# Patient Record
Sex: FEMALE | Race: WHITE | Employment: UNEMPLOYED | URBAN - METROPOLITAN AREA
[De-identification: names, ages, dates, MRNs, and addresses within clinical notes are randomized per-mention and may not be internally consistent; named-entity substitution may affect disease eponyms.]

---

## 2017-10-09 ENCOUNTER — APPOINTMENT (OUTPATIENT)
Dept: GENERAL RADIOLOGY | Age: 42
End: 2017-10-09
Attending: EMERGENCY MEDICINE
Payer: SELF-PAY

## 2017-10-09 ENCOUNTER — HOSPITAL ENCOUNTER (EMERGENCY)
Age: 42
Discharge: HOME | End: 2017-10-10
Attending: EMERGENCY MEDICINE
Payer: SELF-PAY

## 2017-10-09 ENCOUNTER — APPOINTMENT (OUTPATIENT)
Dept: CT IMAGING | Age: 42
End: 2017-10-09
Attending: EMERGENCY MEDICINE
Payer: SELF-PAY

## 2017-10-09 VITALS
DIASTOLIC BLOOD PRESSURE: 79 MMHG | OXYGEN SATURATION: 97 % | SYSTOLIC BLOOD PRESSURE: 175 MMHG | TEMPERATURE: 98.9 F | WEIGHT: 293 LBS | HEART RATE: 99 BPM | RESPIRATION RATE: 18 BRPM | HEIGHT: 62 IN | BODY MASS INDEX: 53.92 KG/M2

## 2017-10-09 DIAGNOSIS — R42 DIZZINESS: Primary | ICD-10-CM

## 2017-10-09 DIAGNOSIS — R51.9 NONINTRACTABLE EPISODIC HEADACHE, UNSPECIFIED HEADACHE TYPE: ICD-10-CM

## 2017-10-09 LAB
APPEARANCE UR: CLEAR
BACTERIA URNS QL MICRO: NEGATIVE /HPF
BASOPHILS # BLD: 0 K/UL (ref 0–0.1)
BASOPHILS NFR BLD: 0 % (ref 0–1)
BILIRUB UR QL: NEGATIVE
COLOR UR: NORMAL
EOSINOPHIL # BLD: 0.2 K/UL (ref 0–0.4)
EOSINOPHIL NFR BLD: 2 % (ref 0–7)
EPITH CASTS URNS QL MICRO: NORMAL /LPF
ERYTHROCYTE [DISTWIDTH] IN BLOOD BY AUTOMATED COUNT: 12.8 % (ref 11.5–14.5)
GLUCOSE UR STRIP.AUTO-MCNC: NEGATIVE MG/DL
HCT VFR BLD AUTO: 38 % (ref 35–47)
HGB BLD-MCNC: 12.6 G/DL (ref 11.5–16)
HGB UR QL STRIP: NEGATIVE
HYALINE CASTS URNS QL MICRO: NORMAL /LPF (ref 0–5)
KETONES UR QL STRIP.AUTO: NEGATIVE MG/DL
LACTATE SERPL-SCNC: 0.9 MMOL/L (ref 0.4–2)
LEUKOCYTE ESTERASE UR QL STRIP.AUTO: NEGATIVE
LYMPHOCYTES # BLD: 2.3 K/UL (ref 0.8–3.5)
LYMPHOCYTES NFR BLD: 23 % (ref 12–49)
MCH RBC QN AUTO: 30.5 PG (ref 26–34)
MCHC RBC AUTO-ENTMCNC: 33.2 G/DL (ref 30–36.5)
MCV RBC AUTO: 92 FL (ref 80–99)
MONOCYTES # BLD: 0.5 K/UL (ref 0–1)
MONOCYTES NFR BLD: 5 % (ref 5–13)
NEUTS SEG # BLD: 7 K/UL (ref 1.8–8)
NEUTS SEG NFR BLD: 70 % (ref 32–75)
NITRITE UR QL STRIP.AUTO: NEGATIVE
PH UR STRIP: 5.5 [PH] (ref 5–8)
PLATELET # BLD AUTO: 241 K/UL (ref 150–400)
PROT UR STRIP-MCNC: NEGATIVE MG/DL
RBC # BLD AUTO: 4.13 M/UL (ref 3.8–5.2)
RBC #/AREA URNS HPF: NORMAL /HPF (ref 0–5)
SP GR UR REFRACTOMETRY: 1.01 (ref 1–1.03)
UA: UC IF INDICATED,UAUC: NORMAL
UROBILINOGEN UR QL STRIP.AUTO: 0.2 EU/DL (ref 0.2–1)
WBC # BLD AUTO: 10.1 K/UL (ref 3.6–11)
WBC URNS QL MICRO: NORMAL /HPF (ref 0–4)

## 2017-10-09 PROCEDURE — 36415 COLL VENOUS BLD VENIPUNCTURE: CPT | Performed by: EMERGENCY MEDICINE

## 2017-10-09 PROCEDURE — 83605 ASSAY OF LACTIC ACID: CPT | Performed by: EMERGENCY MEDICINE

## 2017-10-09 PROCEDURE — 99283 EMERGENCY DEPT VISIT LOW MDM: CPT

## 2017-10-09 PROCEDURE — 71020 XR CHEST PA LAT: CPT

## 2017-10-09 PROCEDURE — 85025 COMPLETE CBC W/AUTO DIFF WBC: CPT | Performed by: EMERGENCY MEDICINE

## 2017-10-09 PROCEDURE — 93005 ELECTROCARDIOGRAM TRACING: CPT

## 2017-10-09 PROCEDURE — 70450 CT HEAD/BRAIN W/O DYE: CPT

## 2017-10-09 PROCEDURE — 81001 URINALYSIS AUTO W/SCOPE: CPT | Performed by: EMERGENCY MEDICINE

## 2017-10-09 NOTE — LETTER
[address] 
[facility] EMERGENCY DEPT 
[address] [place] [address] [phone] 
[phone] Work/School Note Date: 10/9/2017 To Whom It May concern: 
 
Javed Purvis was seen and treated today in the emergency room by the following provider(s): 
Attending Provider: Sabrina Richmond MD. Javed Purvis may return to work on 10/11/17.  
 
Sincerely, 
 
 
 
 
Sabrina Richmond MD

## 2017-10-10 LAB
ALBUMIN SERPL-MCNC: 3.1 G/DL (ref 3.5–5)
ALBUMIN/GLOB SERPL: 0.9 {RATIO} (ref 1.1–2.2)
ALP SERPL-CCNC: 65 U/L (ref 45–117)
ALT SERPL-CCNC: 20 U/L (ref 12–78)
ANION GAP SERPL CALC-SCNC: 7 MMOL/L (ref 5–15)
APTT PPP: 24.9 SEC (ref 22.1–32.5)
AST SERPL-CCNC: 14 U/L (ref 15–37)
BILIRUB SERPL-MCNC: 0.2 MG/DL (ref 0.2–1)
BUN SERPL-MCNC: 12 MG/DL (ref 6–20)
BUN/CREAT SERPL: 15 (ref 12–20)
CALCIUM SERPL-MCNC: 8.2 MG/DL (ref 8.5–10.1)
CHLORIDE SERPL-SCNC: 106 MMOL/L (ref 97–108)
CK MB CFR SERPL CALC: NORMAL % (ref 0–2.5)
CK MB SERPL-MCNC: <1 NG/ML (ref 5–25)
CK SERPL-CCNC: 103 U/L (ref 26–192)
CO2 SERPL-SCNC: 25 MMOL/L (ref 21–32)
CREAT SERPL-MCNC: 0.79 MG/DL (ref 0.55–1.02)
GLOBULIN SER CALC-MCNC: 3.4 G/DL (ref 2–4)
GLUCOSE SERPL-MCNC: 97 MG/DL (ref 65–100)
INR PPP: 1 (ref 0.9–1.1)
LIPASE SERPL-CCNC: 180 U/L (ref 73–393)
POTASSIUM SERPL-SCNC: 3.7 MMOL/L (ref 3.5–5.1)
PROT SERPL-MCNC: 6.5 G/DL (ref 6.4–8.2)
PROTHROMBIN TIME: 10 SEC (ref 9–11.1)
SODIUM SERPL-SCNC: 138 MMOL/L (ref 136–145)
THERAPEUTIC RANGE,PTTT: NORMAL SECS (ref 58–77)
TROPONIN I SERPL-MCNC: <0.04 NG/ML

## 2017-10-10 PROCEDURE — 36415 COLL VENOUS BLD VENIPUNCTURE: CPT | Performed by: EMERGENCY MEDICINE

## 2017-10-10 PROCEDURE — 80053 COMPREHEN METABOLIC PANEL: CPT | Performed by: EMERGENCY MEDICINE

## 2017-10-10 PROCEDURE — 84484 ASSAY OF TROPONIN QUANT: CPT | Performed by: EMERGENCY MEDICINE

## 2017-10-10 PROCEDURE — 85730 THROMBOPLASTIN TIME PARTIAL: CPT | Performed by: EMERGENCY MEDICINE

## 2017-10-10 PROCEDURE — 83690 ASSAY OF LIPASE: CPT | Performed by: EMERGENCY MEDICINE

## 2017-10-10 PROCEDURE — 85610 PROTHROMBIN TIME: CPT | Performed by: EMERGENCY MEDICINE

## 2017-10-10 PROCEDURE — 82550 ASSAY OF CK (CPK): CPT | Performed by: EMERGENCY MEDICINE

## 2017-10-10 RX ORDER — DEXAMETHASONE SODIUM PHOSPHATE 4 MG/ML
10 INJECTION, SOLUTION INTRA-ARTICULAR; INTRALESIONAL; INTRAMUSCULAR; INTRAVENOUS; SOFT TISSUE
Status: DISCONTINUED | OUTPATIENT
Start: 2017-10-10 | End: 2017-10-10 | Stop reason: HOSPADM

## 2017-10-10 RX ORDER — KETOROLAC TROMETHAMINE 30 MG/ML
30 INJECTION, SOLUTION INTRAMUSCULAR; INTRAVENOUS
Status: DISCONTINUED | OUTPATIENT
Start: 2017-10-10 | End: 2017-10-10 | Stop reason: HOSPADM

## 2017-10-10 NOTE — ED NOTES
Pt arrives ambulatory to room c/o headache and dizziness x 2 months. Pt states she has no insurance and has ignored the symptoms until today. Pt states she feels lightheaded when going to stand up. Pt states pain is in back of head and is at 5/10 at this time. Pt states she had some nausea earlier today but has none now. Pt states her head feels like its a fog. Pt denies aphasia or  Monitor x 3. Call bell within reach and plan of care discussed.

## 2017-10-10 NOTE — DISCHARGE INSTRUCTIONS
Dizziness: Care Instructions  Your Care Instructions  Dizziness is the feeling of unsteadiness or fuzziness in your head. It is different than having vertigo, which is a feeling that the room is spinning or that you are moving or falling. It is also different from lightheadedness, which is the feeling that you are about to faint. It can be hard to know what causes dizziness. Some people feel dizzy when they have migraine headaches. Sometimes bouts of flu can make you feel dizzy. Some medical conditions, such as heart problems or high blood pressure, can make you feel dizzy. Many medicines can cause dizziness, including medicines for high blood pressure, pain, or anxiety. If a medicine causes your symptoms, your doctor may recommend that you stop or change the medicine. If it is a problem with your heart, you may need medicine to help your heart work better. If there is no clear reason for your symptoms, your doctor may suggest watching and waiting for a while to see if the dizziness goes away on its own. Follow-up care is a key part of your treatment and safety. Be sure to make and go to all appointments, and call your doctor if you are having problems. It's also a good idea to know your test results and keep a list of the medicines you take. How can you care for yourself at home? · If your doctor recommends or prescribes medicine, take it exactly as directed. Call your doctor if you think you are having a problem with your medicine. · Do not drive while you feel dizzy. · Try to prevent falls. Steps you can take include:  ¨ Using nonskid mats, adding grab bars near the tub, and using night-lights. ¨ Clearing your home so that walkways are free of anything you might trip on. ¨ Letting family and friends know that you have been feeling dizzy. This will help them know how to help you. When should you call for help? Call 911 anytime you think you may need emergency care.  For example, call if:  · You passed out (lost consciousness). · You have dizziness along with symptoms of a heart attack. These may include:  ¨ Chest pain or pressure, or a strange feeling in the chest.  ¨ Sweating. ¨ Shortness of breath. ¨ Nausea or vomiting. ¨ Pain, pressure, or a strange feeling in the back, neck, jaw, or upper belly or in one or both shoulders or arms. ¨ Lightheadedness or sudden weakness. ¨ A fast or irregular heartbeat. · You have symptoms of a stroke. These may include:  ¨ Sudden numbness, tingling, weakness, or loss of movement in your face, arm, or leg, especially on only one side of your body. ¨ Sudden vision changes. ¨ Sudden trouble speaking. ¨ Sudden confusion or trouble understanding simple statements. ¨ Sudden problems with walking or balance. ¨ A sudden, severe headache that is different from past headaches. Call your doctor now or seek immediate medical care if:  · You feel dizzy and have a fever, headache, or ringing in your ears. · You have new or increased nausea and vomiting. · Your dizziness does not go away or comes back. Watch closely for changes in your health, and be sure to contact your doctor if:  · You do not get better as expected. Where can you learn more? Go to http://elissa-devorah.info/. Enter E190 in the search box to learn more about \"Dizziness: Care Instructions. \"  Current as of: March 20, 2017  Content Version: 11.3  © 1920-9040 [employer]. Care instructions adapted under license by [employer] (which disclaims liability or warranty for this information). If you have questions about a medical condition or this instruction, always ask your healthcare professional. [place] [address] any warranty or liability for your use of this information. Head or Face Pain: Care Instructions  Your Care Instructions  Common causes of head or face pain are allergies, stress, and injuries.  Other causes include tooth problems and sinus infections. Eating certain foods, such as chocolate or cheese, or drinking certain liquids, such as coffee or cola, can cause head pain for some people. If you have mild head pain, you may not need treatment. It is important to watch your symptoms and talk to your doctor if your pain continues or gets worse. Follow-up care is a key part of your treatment and safety. Be sure to make and go to all appointments, and call your doctor if you are having problems. It's also a good idea to know your test results and keep a list of the medicines you take. How can you care for yourself at home? · Take pain medicines exactly as directed. ¨ If the doctor gave you a prescription medicine for pain, take it as prescribed. ¨ If you are not taking a prescription pain medicine, ask your doctor if you can take an over-the-counter pain medicine. · Take it easy for the next few days or longer if you are not feeling well. · Use a warm, moist towel or heating pad set on low to relax tight muscles in your shoulder and neck. Have someone gently massage your neck and shoulders. · Put ice or a cold pack on the area for 10 to 20 minutes at a time. Put a thin cloth between the ice and your skin. When should you call for help? Call 911 anytime you think you may need emergency care. For example, call if:  · You have twitching, jerking, or a seizure. · You passed out (lost consciousness). · You have symptoms of a stroke. These may include:  ¨ Sudden numbness, tingling, weakness, or loss of movement in your face, arm, or leg, especially on only one side of your body. ¨ Sudden vision changes. ¨ Sudden trouble speaking. ¨ Sudden confusion or trouble understanding simple statements. ¨ Sudden problems with walking or balance. ¨ A sudden, severe headache that is different from past headaches. · You have jaw pain and pain in your chest, shoulder, neck, or arm.   Call your doctor now or seek immediate medical care if:  · You have a fever with a stiff neck or a severe headache. · You have nausea and vomiting, or you cannot keep food or liquids down. Watch closely for changes in your health, and be sure to contact your doctor if:  · Your head or face pain does not get better as expected. Where can you learn more? Go to http://elissa-devorah.info/. Enter P568 in the search box to learn more about \"Head or Face Pain: Care Instructions. \"  Current as of: March 20, 2017  Content Version: 11.3  © 1252-2500 [employer]. Care instructions adapted under license by [employer] (which disclaims liability or warranty for this information). If you have questions about a medical condition or this instruction, always ask your healthcare professional. [address] any warranty or liability for your use of this information. Headache: Care Instructions  Your Care Instructions    Headaches have many possible causes. Most headaches aren't a sign of a more serious problem, and they will get better on their own. Home treatment may help you feel better faster. The doctor has checked you carefully, but problems can develop later. If you notice any problems or new symptoms, get medical treatment right away. Follow-up care is a key part of your treatment and safety. Be sure to make and go to all appointments, and call your doctor if you are having problems. It's also a good idea to know your test results and keep a list of the medicines you take. How can you care for yourself at home? · Do not drive if you have taken a prescription pain medicine. · Rest in a quiet, dark room until your headache is gone. Close your eyes and try to relax or go to sleep. Don't watch TV or read. · Put a cold, moist cloth or cold pack on the painful area for 10 to 20 minutes at a time. Put a thin cloth between the cold pack and your skin.   · Use a warm, moist towel or a heating pad set on low to relax tight shoulder and neck muscles. · Have someone gently massage your neck and shoulders. · Take pain medicines exactly as directed. ¨ If the doctor gave you a prescription medicine for pain, take it as prescribed. ¨ If you are not taking a prescription pain medicine, ask your doctor if you can take an over-the-counter medicine. · Be careful not to take pain medicine more often than the instructions allow, because you may get worse or more frequent headaches when the medicine wears off. · Do not ignore new symptoms that occur with a headache, such as a fever, weakness or numbness, vision changes, or confusion. These may be signs of a more serious problem. To prevent headaches  · Keep a headache diary so you can figure out what triggers your headaches. Avoiding triggers may help you prevent headaches. Record when each headache began, how long it lasted, and what the pain was like (throbbing, aching, stabbing, or dull). Write down any other symptoms you had with the headache, such as nausea, flashing lights or dark spots, or sensitivity to bright light or loud noise. Note if the headache occurred near your period. List anything that might have triggered the headache, such as certain foods (chocolate, cheese, wine) or odors, smoke, bright light, stress, or lack of sleep. · Find healthy ways to deal with stress. Headaches are most common during or right after stressful times. Take time to relax before and after you do something that has caused a headache in the past.  · Try to keep your muscles relaxed by keeping good posture. Check your jaw, face, neck, and shoulder muscles for tension, and try relaxing them. When sitting at a desk, change positions often, and stretch for 30 seconds each hour. · Get plenty of sleep and exercise. · Eat regularly and well. Long periods without food can trigger a headache. · Treat yourself to a massage.  Some people find that regular massages are very helpful in relieving tension. · Limit caffeine by not drinking too much coffee, tea, or soda. But don't quit caffeine suddenly, because that can also give you headaches. · Reduce eyestrain from computers by blinking frequently and looking away from the computer screen every so often. Make sure you have proper eyewear and that your monitor is set up properly, about an arm's length away. · Seek help if you have depression or anxiety. Your headaches may be linked to these conditions. Treatment can both prevent headaches and help with symptoms of anxiety or depression. When should you call for help? Call 911 anytime you think you may need emergency care. For example, call if:  · You have signs of a stroke. These may include:  ¨ Sudden numbness, paralysis, or weakness in your face, arm, or leg, especially on only one side of your body. ¨ Sudden vision changes. ¨ Sudden trouble speaking. ¨ Sudden confusion or trouble understanding simple statements. ¨ Sudden problems with walking or balance. ¨ A sudden, severe headache that is different from past headaches. Call your doctor now or seek immediate medical care if:  · You have a new or worse headache. · Your headache gets much worse. Where can you learn more? Go to http://elissa-devorah.info/. Enter M271 in the search box to learn more about \"Headache: Care Instructions. \"  Current as of: October 14, 2016  Content Version: 11.3  © 9898-1066 [employer]. Care instructions adapted under license by [employer] (which disclaims liability or warranty for this information). If you have questions about a medical condition or this instruction, always ask your healthcare professional. [place] [address] any warranty or liability for your use of this information. Vertigo: Care Instructions  Your Care Instructions  Vertigo is the feeling that you or your surroundings are moving when there is no actual movement.  It is often described as a feeling of spinning, whirling, falling, or tilting. Vertigo may make you vomit or feel nauseated. You may have trouble standing or walking and may lose your balance. Vertigo is often related to an inner ear problem, but it can have other more serious causes. If vertigo continues, you may need more tests to find its cause. Follow-up care is a key part of your treatment and safety. Be sure to make and go to all appointments, and call your doctor if you are having problems. Its also a good idea to know your test results and keep a list of the medicines you take. How can you care for yourself at home? · Do not lie flat on your back. Prop yourself up slightly. This may reduce the spinning feeling. Keep your eyes open. · Move slowly so that you do not fall. · If your doctor recommends medicine, take it exactly as directed. · Do not drive while you are having vertigo. Certain exercises, called Zhang-Daroff exercises, can help decrease vertigo. To do Zhang-Daroff exercises:  · Sit on the edge of a bed or sofa and quickly lie down on the side that causes the worst vertigo. Lie on your side with your ear down. · Stay in this position for at least 30 seconds or until the vertigo goes away. · Sit up. If this causes vertigo, wait for it to stop. · Repeat the procedure on the other side. · Repeat this 10 times. Do these exercises 2 times a day until the vertigo is gone. When should you call for help? Call 911 anytime you think you may need emergency care. For example, call if:  · You passed out (lost consciousness). · You have symptoms of a stroke. These may include:  ¨ Sudden numbness, tingling, weakness, or loss of movement in your face, arm, or leg, especially on only one side of your body. ¨ Sudden vision changes. ¨ Sudden trouble speaking. ¨ Sudden confusion or trouble understanding simple statements. ¨ Sudden problems with walking or balance.   ¨ A sudden, severe headache that is different from past headaches. Call your doctor now or seek immediate medical care if:  · Vertigo occurs with a fever, a headache, or ringing in your ears. · You have new or increased nausea and vomiting. Watch closely for changes in your health, and be sure to contact your doctor if:  · Vertigo gets worse or happens more often. · Vertigo has not gotten better after 2 weeks. Where can you learn more? Go to http://elissa-devorah.info/. Enter J628 in the search box to learn more about \"Vertigo: Care Instructions. \"  Current as of: July 29, 2016  Content Version: 11.3  © 8554-4889 [employer]. Care instructions adapted under license by [employer] (which disclaims liability or warranty for this information). If you have questions about a medical condition or this instruction, always ask your healthcare professional. [address] any warranty or liability for your use of this information.

## 2017-10-10 NOTE — ED NOTES
Pt received discharge instructions from Dr. Byron Sainz and verbalized understanding. Pt ambulatory to exit.

## 2017-10-10 NOTE — ED PROVIDER NOTES
[facility].  EMERGENCY DEPARTMENT HISTORY AND PHYSICAL EXAM       Date of Service: 10/9/2017   Patient Name: Alexa Harris   YOB: 1975  Medical Record Number: [id number]    History of Presenting Illness     Chief Complaint   Patient presents with    Dizziness     x a few months. Reports sx have become more frequent in recent weeks.  Headache     x a few months. Describes as head \"pressure\", (posterior).  Blurred Vision     x a few months. History Provided By:  patient    Additional History:     Alexa Harris is a 38 y.o. female, who presents ambulatory to the ED c/o intermittent episodes of diffuse L sided pressure-like HA with L sided blurred vision x 3 months. She reports additional intermittent episodes of lightheadedness when standing and nausea. Pt states she felt a \"chilled feeling in my head\" tonight, prompting her visit to the ED. She denies any recent follow up with her PCP or a neurologist. Pt denies any recent medications for her current sxs. She denies any modifying factors for her current sxs. Pt specifically denies any recent fever, chills, nausea, vomiting, diarrhea, abd pain, CP, SOB, lightheadedness, dizziness, numbness, weakness, tingling, BLE swelling, heart palpitations, urinary sxs, changes in BM, changes in PO intake, melena, hematochezia, cough, or congestion. PMHx: Significant for GERD, back pain, depression, hemorrhoid  PSHx: pt denies  Social Hx: +tobacco (1.5 ppd), -EtOH, -Illicit Drugs    There are no other complaints, changes, or physical findings at this time.      Primary Care Provider: None       Past History     Past Medical History:   Past Medical History:   Diagnosis Date    Back pain     Cough     Depression     GERD (gastroesophageal reflux disease)     Hemorrhoid     Irregular heart beat     SOB (shortness of breath)     Stress     Thromboembolus (HCC)     Tiredness     Wheezing Past Surgical History:   History reviewed. No pertinent surgical history. Family History:   Family History   Problem Relation Age of Onset    Mental Retardation Mother     Heart Disease Mother     COPD Mother     Cancer Maternal Grandmother     Diabetes Maternal Grandmother         Social History:   Social History   Substance Use Topics    Smoking status: Current Every Day Smoker     Packs/day: 1.50    Smokeless tobacco: Current User    Alcohol use No        Allergies: Allergies   Allergen Reactions    Pcn [Penicillins] Anaphylaxis    Doxycycline Hives    Naprosyn [Naproxen] Nausea and Vomiting         Review of Systems   Review of Systems   Constitutional: Negative. Negative for chills and fever. Eyes: Positive for visual disturbance (blurred vision L eye). Respiratory: Negative. Negative for shortness of breath. Cardiovascular: Negative for chest pain. Gastrointestinal: Negative for abdominal pain, nausea and vomiting. Endocrine: Negative. Genitourinary: Negative. Negative for difficulty urinating, dysuria and hematuria. Musculoskeletal: Negative. Skin: Negative. Allergic/Immunologic: Negative. Neurological: Positive for dizziness and headaches (L sided). Psychiatric/Behavioral: Negative for suicidal ideas. All other systems reviewed and are negative. Physical Exam    Physical Exam   Constitutional: She is oriented to person, place, and time. She appears well-developed and well-nourished. No distress. obese   HENT:   Head: Normocephalic and atraumatic. Nose: Nose normal.   Eyes: Conjunctivae, EOM and lids are normal. Pupils are equal, round, and reactive to light. No scleral icterus. Right eye exhibits normal extraocular motion and no nystagmus. Left eye exhibits normal extraocular motion and no nystagmus. Neck: Normal range of motion. No tracheal deviation present.    Cardiovascular: Normal rate, regular rhythm, normal heart sounds and intact distal pulses. Exam reveals no friction rub. No murmur heard. Pulmonary/Chest: Effort normal and breath sounds normal. No stridor. No respiratory distress. She has no wheezes. She has no rales. Abdominal: Soft. Bowel sounds are normal. She exhibits no distension. There is no tenderness. There is no rebound. Musculoskeletal: Normal range of motion. She exhibits no tenderness. Neurological: She is alert and oriented to person, place, and time. No cranial nerve deficit. Skin: Skin is warm and dry. No rash noted. She is not diaphoretic. Psychiatric: She has a normal mood and affect. Her speech is normal and behavior is normal. Judgment and thought content normal. Cognition and memory are normal.   Nursing note and vitals reviewed. Provider Notes / MDM:     DDX:  Vertigo, orthostatic hypotension, arrhythmia, ich, tia, cva, uti    Plan:  Labs, ekg, head ct, ua, ivf, meclizine    Impression:  Chronic dizziness     Medical Decision Making   I am the first provider for this patient. I reviewed the vital signs, available nursing notes, past medical history, past surgical history, family history and social history. Old Medical Records: Old medical records. Previous electrocardiograms. Nursing notes. Previous radiology studies. ED Course:   9:42 PM   Initial assessment performed. The patients presenting problems have been discussed, and they are in agreement with the care plan formulated and outlined with them. I have encouraged them to ask questions as they arise throughout their visit. Progress Notes:     EKG interpretation 2104: NSR, nl Axis, rate 95; , QRS 82, QTc 432; no acute ischemia;  Matt Pickard MD    I reviewed our electronic medical record system for any past medical records that were available that may contribute to the patients current condition, the nursing notes and and vital signs from today's visit    Nursing notes will be reviewed as they become available in realtime while the pt has been in the ED. Radha Sellers MD    I have spent 3-7 minutes discussing the medical risks of prolonged smoking habits and advised the patient of the benefits of the cessation of smoking, providing specific suggestions on how to quit. Radha Sellers MD      I personally reviewed pt's imaging. Official read by radiology listed below. Radha Sellers MD      12:54 AM  Progress note:  Pt noted to be feeling better, she states her HA is significantly improved, ready for discharge. Discussed lab and imaging findings with pt and/or family. Pt will follow up as instructed. All questions have been answered, pt voiced understanding and agreement with plan. If narcotics were prescribed, pt was advised not to drive or operate heavy machinery. If abx were prescribed, pt advised that diarrhea and rash are possible side effects of the medications. Specific return precautions provided in addition to instructions for pt to return to the ED immediately should sx worsen at any time.   Radha Sellers MD          Diagnostic Study Results:       Labs       Recent Results (from the past 12 hour(s))   EKG, 12 LEAD, INITIAL    Collection Time: 10/09/17  9:04 PM   Result Value Ref Range    Ventricular Rate 95 BPM    Atrial Rate 95 BPM    P-R Interval 162 ms    QRS Duration 82 ms    Q-T Interval 344 ms    QTC Calculation (Bezet) 432 ms    Calculated P Axis 68 degrees    Calculated R Axis 68 degrees    Calculated T Axis 49 degrees    Diagnosis Normal sinus rhythm  Normal ECG      CBC WITH AUTOMATED DIFF    Collection Time: 10/09/17 10:45 PM   Result Value Ref Range    WBC 10.1 3.6 - 11.0 K/uL    RBC 4.13 3.80 - 5.20 M/uL    HGB 12.6 11.5 - 16.0 g/dL    HCT 38.0 35.0 - 47.0 %    MCV 92.0 80.0 - 99.0 FL    MCH 30.5 26.0 - 34.0 PG    MCHC 33.2 30.0 - 36.5 g/dL    RDW 12.8 11.5 - 14.5 %    PLATELET 507 511 - 203 K/uL    NEUTROPHILS 70 32 - 75 %    LYMPHOCYTES 23 12 - 49 %    MONOCYTES 5 5 - 13 % EOSINOPHILS 2 0 - 7 %    BASOPHILS 0 0 - 1 %    ABS. NEUTROPHILS 7.0 1.8 - 8.0 K/UL    ABS. LYMPHOCYTES 2.3 0.8 - 3.5 K/UL    ABS. MONOCYTES 0.5 0.0 - 1.0 K/UL    ABS. EOSINOPHILS 0.2 0.0 - 0.4 K/UL    ABS. BASOPHILS 0.0 0.0 - 0.1 K/UL   URINALYSIS W/ REFLEX CULTURE    Collection Time: 10/09/17 10:45 PM   Result Value Ref Range    Color YELLOW/STRAW      Appearance CLEAR CLEAR      Specific gravity 1.015 1.003 - 1.030      pH (UA) 5.5 5.0 - 8.0      Protein NEGATIVE  NEG mg/dL    Glucose NEGATIVE  NEG mg/dL    Ketone NEGATIVE  NEG mg/dL    Bilirubin NEGATIVE  NEG      Blood NEGATIVE  NEG      Urobilinogen 0.2 0.2 - 1.0 EU/dL    Nitrites NEGATIVE  NEG      Leukocyte Esterase NEGATIVE  NEG      WBC 0-4 0 - 4 /hpf    RBC 0-5 0 - 5 /hpf    Epithelial cells FEW FEW /lpf    Bacteria NEGATIVE  NEG /hpf    UA:UC IF INDICATED CULTURE NOT INDICATED BY UA RESULT CNI      Hyaline cast 0-2 0 - 5 /lpf   LACTIC ACID    Collection Time: 10/09/17 10:45 PM   Result Value Ref Range    Lactic acid 0.9 0.4 - 2.0 MMOL/L   CK W/ CKMB & INDEX    Collection Time: 10/10/17 12:12 AM   Result Value Ref Range     26 - 192 U/L    CK - MB <1.0 <3.6 NG/ML    CK-MB Index Cannot be calculated 0 - 2.5     LIPASE    Collection Time: 10/10/17 12:12 AM   Result Value Ref Range    Lipase 180 73 - 303 U/L   METABOLIC PANEL, COMPREHENSIVE    Collection Time: 10/10/17 12:12 AM   Result Value Ref Range    Sodium 138 136 - 145 mmol/L    Potassium 3.7 3.5 - 5.1 mmol/L    Chloride 106 97 - 108 mmol/L    CO2 25 21 - 32 mmol/L    Anion gap 7 5 - 15 mmol/L    Glucose 97 65 - 100 mg/dL    BUN 12 6 - 20 MG/DL    Creatinine 0.79 0.55 - 1.02 MG/DL    BUN/Creatinine ratio 15 12 - 20      GFR est AA >60 >60 ml/min/1.73m2    GFR est non-AA >60 >60 ml/min/1.73m2    Calcium 8.2 (L) 8.5 - 10.1 MG/DL    Bilirubin, total 0.2 0.2 - 1.0 MG/DL    ALT (SGPT) 20 12 - 78 U/L    AST (SGOT) 14 (L) 15 - 37 U/L    Alk.  phosphatase 65 45 - 117 U/L    Protein, total 6.5 6.4 - 8.2 g/dL    Albumin 3.1 (L) 3.5 - 5.0 g/dL    Globulin 3.4 2.0 - 4.0 g/dL    A-G Ratio 0.9 (L) 1.1 - 2.2     PROTHROMBIN TIME + INR    Collection Time: 10/10/17 12:12 AM   Result Value Ref Range    INR 1.0 0.9 - 1.1      Prothrombin time 10.0 9.0 - 11.1 sec   PTT    Collection Time: 10/10/17 12:12 AM   Result Value Ref Range    aPTT 24.9 22.1 - 32.5 sec    aPTT, therapeutic range     58.0 - 77.0 SECS   TROPONIN I    Collection Time: 10/10/17 12:12 AM   Result Value Ref Range    Troponin-I, Qt. <0.04 <0.05 ng/mL         Radiology - The following have been ordered and reviewed:    CT Results  (Last 48 hours)               10/09/17 2202  CT HEAD WO CONT Final result    Impression:  IMPRESSION: No abnormalities demonstrated. Narrative:  EXAM:  CT HEAD WO CONT       INDICATION:   vision disturbance; visual disturbance       COMPARISON: None. CONTRAST:  None. TECHNIQUE: Unenhanced CT of the head was performed using 5 mm images. Brain and   bone windows were generated. CT dose reduction was achieved through use of a   standardized protocol tailored for this examination and automatic exposure   control for dose modulation. FINDINGS:   The ventricles and sulci are normal in size, shape and configuration and   midline. There is no significant white matter disease. There is no intracranial   hemorrhage, extra-axial collection, mass, mass effect or midline shift. The   basilar cisterns are open. No acute infarct is identified. The bone windows   demonstrate no abnormalities. The visualized portions of the paranasal sinuses   and mastoid air cells are clear. CXR Results  (Last 48 hours)               10/09/17 2208  XR CHEST PA LAT Final result    Impression:  IMPRESSION: No acute intrathoracic disease. Narrative:  CLINICAL HISTORY: Dizziness    INDICATION: Dizziness       COMPARISON: 12/7/2014       FINDINGS:    PA and lateral views of the chest are obtained.     The cardiopericardial silhouette is within normal limits. There is no pleural   effusion, pneumothorax or focal consolidation present. Vital Signs - Reviewed the patient's vital signs. Patient Vitals for the past 12 hrs:   Temp Pulse Resp BP SpO2   10/09/17 2103 98.9 °F (37.2 °C) 99 18 175/79 97 %       Medications Given in the ED:    Medications   ketorolac (TORADOL) injection 30 mg (not administered)   dexamethasone (DECADRON) 4 mg/mL injection 10 mg (not administered)         Diagnosis   Clinical Impression:   1. Dizziness    2. Nonintractable episodic headache, unspecified headache type         Plan:  1. Follow-up Information     Follow up With Details Comments Contact Info    [facility] EMERGENCY DEPT  As needed [address]  [address]   [address] [address] [place]        2. Current Discharge Medication List        Return to ED if Worse    Disposition Note:    DISCHARGE NOTE:  12:59 AM  The patient's results have been reviewed with family and/or caregiver. They verbally convey their understanding and agreement of the patient's signs, symptoms, diagnosis, treatment, and prognosis. They additionally agree to follow up as recommended in the discharge instructions or to return to the Emergency Room should the patient's condition change prior to their follow-up appointment. The family and/or caregiver verbally agrees with the care-plan and all of their questions have been answered. The discharge instructions have also been provided to the them along with educational information regarding the patient's diagnosis and a list of reasons why the patient would want to return to the ER prior to their follow-up appointment should their condition change.   Written by Razia Alvarado, ED Scribe, as dictated by Maggie Mayfield MD.           This note is prepared by Razia Alvarado, acting as Scribe for MD Maggie Mckinley MD : The scribe's documentation has been prepared under my direction and personally reviewed by me in its entirety. I confirm that the note above accurately reflects all work, treatment, procedures, and medical decision making performed by me. This note will not be viewable in [address]

## 2017-10-11 LAB
ATRIAL RATE: 95 BPM
CALCULATED P AXIS, ECG09: 68 DEGREES
CALCULATED R AXIS, ECG10: 68 DEGREES
CALCULATED T AXIS, ECG11: 49 DEGREES
DIAGNOSIS, 93000: NORMAL
P-R INTERVAL, ECG05: 162 MS
Q-T INTERVAL, ECG07: 344 MS
QRS DURATION, ECG06: 82 MS
QTC CALCULATION (BEZET), ECG08: 432 MS
VENTRICULAR RATE, ECG03: 95 BPM

## 2017-10-12 ENCOUNTER — HOSPITAL ENCOUNTER (EMERGENCY)
Age: 42
Discharge: LEFT BEFORE DISCHARGE | End: 2017-10-12
Attending: EMERGENCY MEDICINE
Payer: SELF-PAY

## 2017-10-12 VITALS
HEART RATE: 91 BPM | TEMPERATURE: 97.4 F | OXYGEN SATURATION: 97 % | HEIGHT: 62 IN | WEIGHT: 293 LBS | BODY MASS INDEX: 53.92 KG/M2 | RESPIRATION RATE: 16 BRPM | SYSTOLIC BLOOD PRESSURE: 133 MMHG | DIASTOLIC BLOOD PRESSURE: 83 MMHG

## 2017-10-12 LAB
ALBUMIN SERPL-MCNC: 3.3 G/DL (ref 3.5–5)
ALBUMIN/GLOB SERPL: 0.8 {RATIO} (ref 1.1–2.2)
ALP SERPL-CCNC: 68 U/L (ref 45–117)
ALT SERPL-CCNC: 22 U/L (ref 12–78)
ANION GAP SERPL CALC-SCNC: 9 MMOL/L (ref 5–15)
AST SERPL-CCNC: 11 U/L (ref 15–37)
ATRIAL RATE: 72 BPM
BASOPHILS # BLD: 0 K/UL (ref 0–0.1)
BASOPHILS NFR BLD: 0 % (ref 0–1)
BILIRUB SERPL-MCNC: 0.4 MG/DL (ref 0.2–1)
BUN SERPL-MCNC: 14 MG/DL (ref 6–20)
BUN/CREAT SERPL: 16 (ref 12–20)
CALCIUM SERPL-MCNC: 8.6 MG/DL (ref 8.5–10.1)
CALCULATED P AXIS, ECG09: 56 DEGREES
CALCULATED R AXIS, ECG10: 70 DEGREES
CALCULATED T AXIS, ECG11: 36 DEGREES
CHLORIDE SERPL-SCNC: 104 MMOL/L (ref 97–108)
CO2 SERPL-SCNC: 24 MMOL/L (ref 21–32)
CREAT SERPL-MCNC: 0.88 MG/DL (ref 0.55–1.02)
DIAGNOSIS, 93000: NORMAL
EOSINOPHIL # BLD: 0.3 K/UL (ref 0–0.4)
EOSINOPHIL NFR BLD: 2 % (ref 0–7)
ERYTHROCYTE [DISTWIDTH] IN BLOOD BY AUTOMATED COUNT: 12.7 % (ref 11.5–14.5)
GLOBULIN SER CALC-MCNC: 3.9 G/DL (ref 2–4)
GLUCOSE SERPL-MCNC: 89 MG/DL (ref 65–100)
HCT VFR BLD AUTO: 39 % (ref 35–47)
HGB BLD-MCNC: 13 G/DL (ref 11.5–16)
LYMPHOCYTES # BLD: 2.6 K/UL (ref 0.8–3.5)
LYMPHOCYTES NFR BLD: 25 % (ref 12–49)
MCH RBC QN AUTO: 31 PG (ref 26–34)
MCHC RBC AUTO-ENTMCNC: 33.3 G/DL (ref 30–36.5)
MCV RBC AUTO: 93.1 FL (ref 80–99)
MONOCYTES # BLD: 0.6 K/UL (ref 0–1)
MONOCYTES NFR BLD: 5 % (ref 5–13)
NEUTS SEG # BLD: 7.2 K/UL (ref 1.8–8)
NEUTS SEG NFR BLD: 68 % (ref 32–75)
P-R INTERVAL, ECG05: 156 MS
PLATELET # BLD AUTO: 257 K/UL (ref 150–400)
POTASSIUM SERPL-SCNC: 3.7 MMOL/L (ref 3.5–5.1)
PROT SERPL-MCNC: 7.2 G/DL (ref 6.4–8.2)
Q-T INTERVAL, ECG07: 416 MS
QRS DURATION, ECG06: 86 MS
QTC CALCULATION (BEZET), ECG08: 455 MS
RBC # BLD AUTO: 4.19 M/UL (ref 3.8–5.2)
SODIUM SERPL-SCNC: 137 MMOL/L (ref 136–145)
VENTRICULAR RATE, ECG03: 72 BPM
WBC # BLD AUTO: 10.7 K/UL (ref 3.6–11)

## 2017-10-12 PROCEDURE — 93005 ELECTROCARDIOGRAM TRACING: CPT

## 2017-10-12 PROCEDURE — 85025 COMPLETE CBC W/AUTO DIFF WBC: CPT | Performed by: EMERGENCY MEDICINE

## 2017-10-12 PROCEDURE — 36415 COLL VENOUS BLD VENIPUNCTURE: CPT | Performed by: EMERGENCY MEDICINE

## 2017-10-12 PROCEDURE — 75810000275 HC EMERGENCY DEPT VISIT NO LEVEL OF CARE

## 2017-10-12 PROCEDURE — 80053 COMPREHEN METABOLIC PANEL: CPT | Performed by: EMERGENCY MEDICINE

## 2017-10-12 NOTE — ED NOTES
Patient reports all of her symptoms have resolved and she would like to leave. PIV discontinued, and patient ambulatory with a steady gait out of department accompanied by male .

## 2017-10-12 NOTE — ED TRIAGE NOTES
Triage note: Patient arriving c/o HA and dizziness intermittent for the last few months. Patient reports being seen at [address] [facility] for the same two nights ago and diagnosed with vertigo, \"but I don't believe it\". Patient reports fullness in her head and blurry vision.

## 2018-01-21 ENCOUNTER — HOSPITAL ENCOUNTER (EMERGENCY)
Age: 43
Discharge: HOME | End: 2018-01-22
Attending: EMERGENCY MEDICINE
Payer: SELF-PAY

## 2018-01-21 VITALS
BODY MASS INDEX: 53.92 KG/M2 | SYSTOLIC BLOOD PRESSURE: 153 MMHG | HEIGHT: 62 IN | TEMPERATURE: 98.3 F | OXYGEN SATURATION: 99 % | DIASTOLIC BLOOD PRESSURE: 97 MMHG | HEART RATE: 77 BPM | RESPIRATION RATE: 16 BRPM | WEIGHT: 293 LBS

## 2018-01-21 DIAGNOSIS — R42 DIZZY SPELLS: Primary | ICD-10-CM

## 2018-01-21 DIAGNOSIS — R51.9 PRESSURE IN HEAD: ICD-10-CM

## 2018-01-21 PROCEDURE — 93005 ELECTROCARDIOGRAM TRACING: CPT

## 2018-01-21 PROCEDURE — 99283 EMERGENCY DEPT VISIT LOW MDM: CPT

## 2018-01-22 ENCOUNTER — OFFICE VISIT (OUTPATIENT)
Dept: INTERNAL MEDICINE CLINIC | Age: 43
End: 2018-01-22

## 2018-01-22 VITALS
HEIGHT: 62 IN | DIASTOLIC BLOOD PRESSURE: 82 MMHG | BODY MASS INDEX: 53.92 KG/M2 | HEART RATE: 99 BPM | WEIGHT: 293 LBS | TEMPERATURE: 98.2 F | OXYGEN SATURATION: 98 % | SYSTOLIC BLOOD PRESSURE: 126 MMHG | RESPIRATION RATE: 18 BRPM

## 2018-01-22 DIAGNOSIS — Z13.220 SCREENING FOR HYPERLIPIDEMIA: ICD-10-CM

## 2018-01-22 DIAGNOSIS — R42 DIZZINESS: Primary | ICD-10-CM

## 2018-01-22 DIAGNOSIS — R51.9 PRESSURE IN HEAD: ICD-10-CM

## 2018-01-22 DIAGNOSIS — Z13.1 SCREENING FOR DIABETES MELLITUS: ICD-10-CM

## 2018-01-22 DIAGNOSIS — H43.393 FLOATERS IN VISUAL FIELD, BILATERAL: ICD-10-CM

## 2018-01-22 LAB
ALBUMIN SERPL-MCNC: 3.4 G/DL (ref 3.5–5)
ALBUMIN/GLOB SERPL: 1 {RATIO} (ref 1.1–2.2)
ALP SERPL-CCNC: 67 U/L (ref 45–117)
ALT SERPL-CCNC: 22 U/L (ref 12–78)
ANION GAP SERPL CALC-SCNC: 8 MMOL/L (ref 5–15)
APPEARANCE UR: CLEAR
AST SERPL-CCNC: 15 U/L (ref 15–37)
ATRIAL RATE: 76 BPM
BACTERIA URNS QL MICRO: NEGATIVE /HPF
BASOPHILS # BLD: 0 K/UL (ref 0–0.1)
BASOPHILS NFR BLD: 0 % (ref 0–1)
BILIRUB SERPL-MCNC: 0.4 MG/DL (ref 0.2–1)
BILIRUB UR QL: NEGATIVE
BUN SERPL-MCNC: 12 MG/DL (ref 6–20)
BUN/CREAT SERPL: 15 (ref 12–20)
CALCIUM SERPL-MCNC: 8.6 MG/DL (ref 8.5–10.1)
CALCULATED P AXIS, ECG09: 66 DEGREES
CALCULATED R AXIS, ECG10: 87 DEGREES
CALCULATED T AXIS, ECG11: 36 DEGREES
CHLORIDE SERPL-SCNC: 106 MMOL/L (ref 97–108)
CK MB CFR SERPL CALC: 1.3 % (ref 0–2.5)
CK MB SERPL-MCNC: 1.2 NG/ML (ref 5–25)
CK SERPL-CCNC: 90 U/L (ref 26–192)
CO2 SERPL-SCNC: 25 MMOL/L (ref 21–32)
COLOR UR: ABNORMAL
CREAT SERPL-MCNC: 0.79 MG/DL (ref 0.55–1.02)
DIAGNOSIS, 93000: NORMAL
EOSINOPHIL # BLD: 0.2 K/UL (ref 0–0.4)
EOSINOPHIL NFR BLD: 3 % (ref 0–7)
EPITH CASTS URNS QL MICRO: ABNORMAL /LPF
ERYTHROCYTE [DISTWIDTH] IN BLOOD BY AUTOMATED COUNT: 12.8 % (ref 11.5–14.5)
GLOBULIN SER CALC-MCNC: 3.5 G/DL (ref 2–4)
GLUCOSE SERPL-MCNC: 100 MG/DL (ref 65–100)
GLUCOSE UR STRIP.AUTO-MCNC: NEGATIVE MG/DL
HCG UR QL: NEGATIVE
HCT VFR BLD AUTO: 39 % (ref 35–47)
HGB BLD-MCNC: 12.8 G/DL (ref 11.5–16)
HGB UR QL STRIP: NEGATIVE
HYALINE CASTS URNS QL MICRO: ABNORMAL /LPF (ref 0–5)
KETONES UR QL STRIP.AUTO: NEGATIVE MG/DL
LEUKOCYTE ESTERASE UR QL STRIP.AUTO: ABNORMAL
LYMPHOCYTES # BLD: 1.8 K/UL (ref 0.8–3.5)
LYMPHOCYTES NFR BLD: 26 % (ref 12–49)
MCH RBC QN AUTO: 30 PG (ref 26–34)
MCHC RBC AUTO-ENTMCNC: 32.8 G/DL (ref 30–36.5)
MCV RBC AUTO: 91.3 FL (ref 80–99)
MONOCYTES # BLD: 0.4 K/UL (ref 0–1)
MONOCYTES NFR BLD: 5 % (ref 5–13)
NEUTS SEG # BLD: 4.7 K/UL (ref 1.8–8)
NEUTS SEG NFR BLD: 66 % (ref 32–75)
NITRITE UR QL STRIP.AUTO: NEGATIVE
P-R INTERVAL, ECG05: 166 MS
PH UR STRIP: 6 [PH] (ref 5–8)
PLATELET # BLD AUTO: 232 K/UL (ref 150–400)
POTASSIUM SERPL-SCNC: 3.9 MMOL/L (ref 3.5–5.1)
PROT SERPL-MCNC: 6.9 G/DL (ref 6.4–8.2)
PROT UR STRIP-MCNC: NEGATIVE MG/DL
Q-T INTERVAL, ECG07: 388 MS
QRS DURATION, ECG06: 82 MS
QTC CALCULATION (BEZET), ECG08: 436 MS
RBC # BLD AUTO: 4.27 M/UL (ref 3.8–5.2)
RBC #/AREA URNS HPF: ABNORMAL /HPF (ref 0–5)
SODIUM SERPL-SCNC: 139 MMOL/L (ref 136–145)
SP GR UR REFRACTOMETRY: 1.01 (ref 1–1.03)
TROPONIN I SERPL-MCNC: <0.04 NG/ML
UA: UC IF INDICATED,UAUC: ABNORMAL
UROBILINOGEN UR QL STRIP.AUTO: 0.2 EU/DL (ref 0.2–1)
VENTRICULAR RATE, ECG03: 76 BPM
WBC # BLD AUTO: 7.1 K/UL (ref 3.6–11)
WBC URNS QL MICRO: ABNORMAL /HPF (ref 0–4)

## 2018-01-22 PROCEDURE — 81025 URINE PREGNANCY TEST: CPT | Performed by: EMERGENCY MEDICINE

## 2018-01-22 PROCEDURE — 81001 URINALYSIS AUTO W/SCOPE: CPT | Performed by: EMERGENCY MEDICINE

## 2018-01-22 PROCEDURE — 82550 ASSAY OF CK (CPK): CPT | Performed by: EMERGENCY MEDICINE

## 2018-01-22 PROCEDURE — 84484 ASSAY OF TROPONIN QUANT: CPT | Performed by: EMERGENCY MEDICINE

## 2018-01-22 PROCEDURE — 80053 COMPREHEN METABOLIC PANEL: CPT | Performed by: EMERGENCY MEDICINE

## 2018-01-22 PROCEDURE — 85025 COMPLETE CBC W/AUTO DIFF WBC: CPT | Performed by: EMERGENCY MEDICINE

## 2018-01-22 PROCEDURE — 36415 COLL VENOUS BLD VENIPUNCTURE: CPT | Performed by: EMERGENCY MEDICINE

## 2018-01-22 RX ORDER — SODIUM CHLORIDE 0.9 % (FLUSH) 0.9 %
5-10 SYRINGE (ML) INJECTION EVERY 8 HOURS
Status: DISCONTINUED | OUTPATIENT
Start: 2018-01-22 | End: 2018-01-22 | Stop reason: HOSPADM

## 2018-01-22 RX ORDER — SODIUM CHLORIDE 0.9 % (FLUSH) 0.9 %
5-10 SYRINGE (ML) INJECTION AS NEEDED
Status: DISCONTINUED | OUTPATIENT
Start: 2018-01-22 | End: 2018-01-22 | Stop reason: HOSPADM

## 2018-01-22 NOTE — LETTER
1/26/2018 1:50 PM 
 
Ms. Paxton Fraser 59 [phone] Dear Paxton Crooks: Please find your most recent results below. Resulted Orders HEMOGLOBIN A1C W/O EAG Result Value Ref Range Hemoglobin A1c 5.3 4.8 - 5.6 % Comment:  
            Pre-diabetes: 5.7 - 6.4 Diabetes: >6.4 Glycemic control for adults with diabetes: <7.0 Narrative Performed at:  [address]  [id number] : Darian Miller MD, Phone:  [phone] LIPID PANEL Result Value Ref Range Cholesterol, total 197 100 - 199 mg/dL Triglyceride 84 0 - 149 mg/dL HDL Cholesterol 36 (L) >39 mg/dL VLDL, calculated 17 5 - 40 mg/dL LDL, calculated 144 (H) 0 - 99 mg/dL Narrative Performed at:  [address]  [id number] : Darian Miller MD, Phone:  [phone] THYROID PANEL W/TSH Result Value Ref Range TSH 1.250 0.450 - 4.500 uIU/mL T4, Total 8.5 4.5 - 12.0 ug/dL  
 T3 Uptake 24 24 - 39 % Free Thyroxine Index (FTI) 2.0 1.2 - 4.9 Narrative Performed at:  [address]  [id number] : Darian Miller MD, Phone:  [phone] RECOMMENDATIONS: 
Labs normal except mildly elevated LDL cholesterol 144. Goal is less than 100.  Recommend follow low fat diet and exercise. Thyroid test normal. Not diabetic.  Recommend follow up annual and as needed Please call me if you have any questions: [phone] Sincerely, 
 
 
Faheem Ying MD

## 2018-01-22 NOTE — DISCHARGE INSTRUCTIONS
Dizziness: Care Instructions  Your Care Instructions  Dizziness is the feeling of unsteadiness or fuzziness in your head. It is different than having vertigo, which is a feeling that the room is spinning or that you are moving or falling. It is also different from lightheadedness, which is the feeling that you are about to faint. It can be hard to know what causes dizziness. Some people feel dizzy when they have migraine headaches. Sometimes bouts of flu can make you feel dizzy. Some medical conditions, such as heart problems or high blood pressure, can make you feel dizzy. Many medicines can cause dizziness, including medicines for high blood pressure, pain, or anxiety. If a medicine causes your symptoms, your doctor may recommend that you stop or change the medicine. If it is a problem with your heart, you may need medicine to help your heart work better. If there is no clear reason for your symptoms, your doctor may suggest watching and waiting for a while to see if the dizziness goes away on its own. Follow-up care is a key part of your treatment and safety. Be sure to make and go to all appointments, and call your doctor if you are having problems. It's also a good idea to know your test results and keep a list of the medicines you take. How can you care for yourself at home? · If your doctor recommends or prescribes medicine, take it exactly as directed. Call your doctor if you think you are having a problem with your medicine. · Do not drive while you feel dizzy. · Try to prevent falls. Steps you can take include:  ¨ Using nonskid mats, adding grab bars near the tub, and using night-lights. ¨ Clearing your home so that walkways are free of anything you might trip on. ¨ Letting family and friends know that you have been feeling dizzy. This will help them know how to help you. When should you call for help? Call 911 anytime you think you may need emergency care.  For example, call if:  ? · You passed out (lost consciousness). ? · You have dizziness along with symptoms of a heart attack. These may include:  ¨ Chest pain or pressure, or a strange feeling in the chest.  ¨ Sweating. ¨ Shortness of breath. ¨ Nausea or vomiting. ¨ Pain, pressure, or a strange feeling in the back, neck, jaw, or upper belly or in one or both shoulders or arms. ¨ Lightheadedness or sudden weakness. ¨ A fast or irregular heartbeat. ? · You have symptoms of a stroke. These may include:  ¨ Sudden numbness, tingling, weakness, or loss of movement in your face, arm, or leg, especially on only one side of your body. ¨ Sudden vision changes. ¨ Sudden trouble speaking. ¨ Sudden confusion or trouble understanding simple statements. ¨ Sudden problems with walking or balance. ¨ A sudden, severe headache that is different from past headaches. ?Call your doctor now or seek immediate medical care if:  ? · You feel dizzy and have a fever, headache, or ringing in your ears. ? · You have new or increased nausea and vomiting. ? · Your dizziness does not go away or comes back. ? Watch closely for changes in your health, and be sure to contact your doctor if:  ? · You do not get better as expected. Where can you learn more? Go to http://elissa-devorah.info/. Enter H457 in the search box to learn more about \"Dizziness: Care Instructions. \"  Current as of: March 20, 2017  Content Version: 11.4  © 6520-2516 [employer]. Care instructions adapted under license by [employer] (which disclaims liability or warranty for this information). If you have questions about a medical condition or this instruction, always ask your healthcare professional. [place] [address] any warranty or liability for your use of this information. Head or Face Pain: Care Instructions  Your Care Instructions    Common causes of head or face pain are allergies, stress, and injuries.  Other causes include tooth problems and sinus infections. Eating certain foods, such as chocolate or cheese, or drinking certain liquids, such as coffee or cola, can cause head pain for some people. If you have mild head pain, you may not need treatment. It is important to watch your symptoms and talk to your doctor if your pain continues or gets worse. Follow-up care is a key part of your treatment and safety. Be sure to make and go to all appointments, and call your doctor if you are having problems. It's also a good idea to know your test results and keep a list of the medicines you take. How can you care for yourself at home? · Take pain medicines exactly as directed. ¨ If the doctor gave you a prescription medicine for pain, take it as prescribed. ¨ If you are not taking a prescription pain medicine, ask your doctor if you can take an over-the-counter pain medicine. · Take it easy for the next few days or longer if you are not feeling well. · Use a warm, moist towel or heating pad set on low to relax tight muscles in your shoulder and neck. Have someone gently massage your neck and shoulders. · Put ice or a cold pack on the area for 10 to 20 minutes at a time. Put a thin cloth between the ice and your skin. When should you call for help? Call 911 anytime you think you may need emergency care. For example, call if:  ? · You have twitching, jerking, or a seizure. ? · You passed out (lost consciousness). ? · You have symptoms of a stroke. These may include:  ¨ Sudden numbness, tingling, weakness, or loss of movement in your face, arm, or leg, especially on only one side of your body. ¨ Sudden vision changes. ¨ Sudden trouble speaking. ¨ Sudden confusion or trouble understanding simple statements. ¨ Sudden problems with walking or balance. ¨ A sudden, severe headache that is different from past headaches. ? · You have jaw pain and pain in your chest, shoulder, neck, or arm.    ?Call your doctor now or seek immediate medical care if:  ? · You have a fever with a stiff neck or a severe headache. ? · You have nausea and vomiting, or you cannot keep food or liquids down. ? Watch closely for changes in your health, and be sure to contact your doctor if:  ? · Your head or face pain does not get better as expected. Where can you learn more? Go to http://elissa-devorah.info/. Enter P568 in the search box to learn more about \"Head or Face Pain: Care Instructions. \"  Current as of: March 20, 2017  Content Version: 11.4  © 8741-1703 [employer]. Care instructions adapted under license by [employer] (which disclaims liability or warranty for this information). If you have questions about a medical condition or this instruction, always ask your healthcare professional. [address] any warranty or liability for your use of this information.

## 2018-01-22 NOTE — ED PROVIDER NOTES
EMERGENCY DEPARTMENT HISTORY AND PHYSICAL EXAM      Date: 1/21/2018  Patient Name: Yaron Amado    History of Presenting Illness     Chief Complaint   Patient presents with    Dizziness     Pt ambulatory to triage with c/o dizziness for a few months. Pt states the last time she was seen, she was diagnoised with vertigo. Pt states \"this is not vertigo\". States \"head feels heavy\". Reports nausea. History Provided By: Patient    HPI: Yaron Amado is a [address] y.o[address] female, pmhx vertigo, who presents ambulatory to the ED c/o intermittent episodes of lightheadedness with associated HAs over the last 2 months. Pt reports additional \"waves\" of nausea and tinnitus. She states her last \"wave\" was earlier this evening, prompting her visit to the ED. Pt denies any recent medications for her current sxs. She notes a hx of similar sxs with her previous episode of vertigo. Pt denies any specific modifying factors. She otherwise specifically denies any recent fever, chills, vomiting, diarrhea, abd pain, CP, SOB, dizziness, numbness, weakness, tingling, BLE swelling, heart palpitations, urinary sxs, changes in BM, changes in PO intake, melena, hematochezia, cough, or congestion. PCP: Rolando Bolanos MD    PMHx: Significant for GERD, chronic back pain, depression, vertigo  PSHx: pt denies  Social Hx: +tobacco (1 ppd), -EtOH, -Illicit Drugs    There are no other complaints, changes, or physical findings at this time. Current Facility-Administered Medications   Medication Dose Route Frequency Provider Last Rate Last Dose    sodium chloride (NS) flush 5-10 mL  5-10 mL IntraVENous Q8H Lolita William MD        sodium chloride (NS) flush 5-10 mL  5-10 mL IntraVENous PRN Lolita William MD         Current Outpatient Prescriptions   Medication Sig Dispense Refill    omeprazole (PRILOSEC OTC) 20 mg tablet Take 20 mg by mouth daily.       acetaminophen (TYLENOL) 325 mg tablet Take 650 mg by mouth every four (4) hours as needed for Pain. Indications: FEVER, PAIN         Past History     Past Medical History:  Past Medical History:   Diagnosis Date    Back pain     Cough     Depression     GERD (gastroesophageal reflux disease)     Hemorrhoid     Irregular heart beat     SOB (shortness of breath)     Stress     Thromboembolus (HCC)     Tiredness     Wheezing        Past Surgical History:  History reviewed. No pertinent surgical history. Family History:  Family History   Problem Relation Age of Onset    Mental Retardation Mother     Heart Disease Mother     COPD Mother     Cancer Maternal Grandmother     Diabetes Maternal Grandmother        Social History:  Social History   Substance Use Topics    Smoking status: Current Every Day Smoker     Packs/day: 1.50    Smokeless tobacco: Current User    Alcohol use No       Allergies: Allergies   Allergen Reactions    Pcn [Penicillins] Anaphylaxis    Doxycycline Hives    Naprosyn [Naproxen] Nausea and Vomiting         Review of Systems   Review of Systems   Constitutional: Negative for chills and fever. HENT: Positive for tinnitus. Negative for congestion, ear pain, rhinorrhea and sore throat. Respiratory: Negative for cough and shortness of breath. Cardiovascular: Negative for chest pain, palpitations and leg swelling. Gastrointestinal: Positive for nausea (resolved on exam). Negative for abdominal pain, constipation, diarrhea and vomiting. No melena  No hematochezia   Endocrine: Negative for polyuria. Genitourinary: Negative for dysuria, frequency and hematuria. Neurological: Positive for light-headedness and headaches. Negative for dizziness, weakness and numbness. No tingling   All other systems reviewed and are negative. Physical Exam   Physical Exam   Nursing note and vitals reviewed.     General appearance - obese, well appearing, and in no distress  Eyes - pupils equal and reactive, extraocular eye movements intact  ENT - mucous membranes moist, pharynx normal without lesions, BL TMs clear  Neck - supple, no significant adenopathy; non-tender to palpation  Chest - clear to auscultation, no wheezes, rales or rhonchi; non-tender to palpation  Heart - normal rate and regular rhythm, S1 and S2 normal, no murmurs noted  Abdomen - soft, nontender, nondistended, no masses or organomegaly  Musculoskeletal - no joint tenderness, deformity or swelling; normal ROM  Extremities - peripheral pulses normal, no pedal edema  Skin - normal coloration and turgor, no rashes  Neurological - alert, oriented x3, normal speech, no focal findings or movement disorder noted  Written by Faiza Rowe ED Scribe, as dictated by Zelda Reynolds MD      Diagnostic Study Results     Labs -     Recent Results (from the past 12 hour(s))   EKG, 12 LEAD, INITIAL    Collection Time: 01/21/18 11:53 PM   Result Value Ref Range    Ventricular Rate 76 BPM    Atrial Rate 76 BPM    P-R Interval 166 ms    QRS Duration 82 ms    Q-T Interval 388 ms    QTC Calculation (Bezet) 436 ms    Calculated P Axis 66 degrees    Calculated R Axis 87 degrees    Calculated T Axis 36 degrees    Diagnosis       Normal sinus rhythm  Low voltage QRS  Cannot rule out Anterior infarct , age undetermined  Abnormal ECG  When compared with ECG of 12-OCT-2017 01:58,  No significant change was found     URINALYSIS W/ REFLEX CULTURE    Collection Time: 01/22/18 12:50 AM   Result Value Ref Range    Color YELLOW/STRAW      Appearance CLEAR CLEAR      Specific gravity 1.010 1.003 - 1.030      pH (UA) 6.0 5.0 - 8.0      Protein NEGATIVE  NEG mg/dL    Glucose NEGATIVE  NEG mg/dL    Ketone NEGATIVE  NEG mg/dL    Bilirubin NEGATIVE  NEG      Blood NEGATIVE  NEG      Urobilinogen 0.2 0.2 - 1.0 EU/dL    Nitrites NEGATIVE  NEG      Leukocyte Esterase TRACE (A) NEG      WBC 0-4 0 - 4 /hpf    RBC 0-5 0 - 5 /hpf    Epithelial cells FEW FEW /lpf    Bacteria NEGATIVE  NEG /hpf    UA:UC IF INDICATED CULTURE NOT INDICATED BY UA RESULT CNI      Hyaline cast 0-2 0 - 5 /lpf   HCG URINE, QL    Collection Time: 01/22/18 12:50 AM   Result Value Ref Range    HCG urine, Ql. NEGATIVE  NEG     CBC WITH AUTOMATED DIFF    Collection Time: 01/22/18  2:05 AM   Result Value Ref Range    WBC 7.1 3.6 - 11.0 K/uL    RBC 4.27 3.80 - 5.20 M/uL    HGB 12.8 11.5 - 16.0 g/dL    HCT 39.0 35.0 - 47.0 %    MCV 91.3 80.0 - 99.0 FL    MCH 30.0 26.0 - 34.0 PG    MCHC 32.8 30.0 - 36.5 g/dL    RDW 12.8 11.5 - 14.5 %    PLATELET 007 539 - 308 K/uL    NEUTROPHILS 66 32 - 75 %    LYMPHOCYTES 26 12 - 49 %    MONOCYTES 5 5 - 13 %    EOSINOPHILS 3 0 - 7 %    BASOPHILS 0 0 - 1 %    ABS. NEUTROPHILS 4.7 1.8 - 8.0 K/UL    ABS. LYMPHOCYTES 1.8 0.8 - 3.5 K/UL    ABS. MONOCYTES 0.4 0.0 - 1.0 K/UL    ABS. EOSINOPHILS 0.2 0.0 - 0.4 K/UL    ABS. BASOPHILS 0.0 0.0 - 0.1 K/UL   METABOLIC PANEL, COMPREHENSIVE    Collection Time: 01/22/18  2:05 AM   Result Value Ref Range    Sodium 139 136 - 145 mmol/L    Potassium 3.9 3.5 - 5.1 mmol/L    Chloride 106 97 - 108 mmol/L    CO2 25 21 - 32 mmol/L    Anion gap 8 5 - 15 mmol/L    Glucose 100 65 - 100 mg/dL    BUN 12 6 - 20 MG/DL    Creatinine 0.79 0.55 - 1.02 MG/DL    BUN/Creatinine ratio 15 12 - 20      GFR est AA >60 >60 ml/min/1.73m2    GFR est non-AA >60 >60 ml/min/1.73m2    Calcium 8.6 8.5 - 10.1 MG/DL    Bilirubin, total 0.4 0.2 - 1.0 MG/DL    ALT (SGPT) 22 12 - 78 U/L    AST (SGOT) 15 15 - 37 U/L    Alk. phosphatase 67 45 - 117 U/L    Protein, total 6.9 6.4 - 8.2 g/dL    Albumin 3.4 (L) 3.5 - 5.0 g/dL    Globulin 3.5 2.0 - 4.0 g/dL    A-G Ratio 1.0 (L) 1.1 - 2.2     CK W/ CKMB & INDEX    Collection Time: 01/22/18  2:05 AM   Result Value Ref Range    CK 90 26 - 192 U/L    CK - MB 1.2 <3.6 NG/ML    CK-MB Index 1.3 0 - 2.5     TROPONIN I    Collection Time: 01/22/18  2:05 AM   Result Value Ref Range    Troponin-I, Qt. <0.04 <0.05 ng/mL         Medical Decision Making   I am the first provider for this patient.     I reviewed the vital signs, available nursing notes, past medical history, past surgical history, family history and social history. Vital Signs-Reviewed the patient's vital signs. Patient Vitals for the past 12 hrs:   Temp Pulse Resp BP SpO2   01/21/18 2348 98.3 °F (36.8 °C) 77 16 (!) 153/97 99 %       Records Reviewed: Nursing Notes and Old Medical Records    Provider Notes (Medical Decision Making):     DDx: nicotine dependence, Tension HA vs. Cluster HA vs. Vascular HA vs. Migraine, vertigo, orthostatic hypotension, arrhythmia    ED Course:   Initial assessment performed. The patients presenting problems have been discussed, and they are in agreement with the care plan formulated and outlined with them. I have encouraged them to ask questions as they arise throughout their visit.    1:01 AM   Discussed the risks of smoking and the benefits of smoking cessation as well as the long term sequelae of smoking with the pt who verbalized her understanding. Reviewed strategies for success, including gradually decreasing the number of cigarettes smoked a day. Written by Eric Jain ED Scribe, as dictated by Lolita William MD    EKG interpretation: (Preliminary) 2358  Rhythm: normal sinus rhythm. Rate (approx.): 76bpm; Axis: normal; Normal NM, QRS, QTc intervals; ST/T wave: no ischemic changes; Other findings: non-ischemic. Written by GIO Breaux, as dictated by Evelyn Garrett MD      Progress note:  3:31 AM  Pt noted to be feeling better, pt requesting discharge at this time as her 25 y.o. Son is now c/o HA and dizziness and would like to be checked in to the ER. She states she needs to be with him for evaluation and denies any additional dizziness throughout her ED stay. Updated pt and/or family on all final lab findings. Will follow up as instructed. All questions have been answered, pt voiced understanding and agreement with plan.  Specific return precautions provided as well as instructions to return to the ED should sx worsen at any time. Vital signs stable for discharge. Written by Suresh Briceño ED Scribe, as dictated by Lolita William MD        Diagnosis     Clinical Impression:   1. Dizzy spells    2. Pressure in head        PLAN:  1. Discharge Medication List as of 1/22/2018  3:28 AM        2. Follow-up Information     Follow up With Details Comments Contact Info    Trinh Chris MD In 2 days  [address] [facility]  [phone]      [facility] EMERGENCY DEPT  If symptoms worsen [address] [place] [address]  [phone]        Return to ED if worse     Disposition:    DISCHARGE NOTE:  3:31 AM  The patient's results have been reviewed with family and/or caregiver. They verbally convey their understanding and agreement of the patient's signs, symptoms, diagnosis, treatment, and prognosis. They additionally agree to follow up as recommended in the discharge instructions or to return to the Emergency Room should the patient's condition change prior to their follow-up appointment. The family and/or caregiver verbally agrees with the care-plan and all of their questions have been answered. The discharge instructions have also been provided to the them along with educational information regarding the patient's diagnosis and a list of reasons why the patient would want to return to the ER prior to their follow-up appointment should their condition change. Written by IGO Raza, as dictated by Mariluz Coombs MD.             Attestations: This note is prepared by Suresh Briceño, acting as Scribe for MD Lolita Jeffries MD : The scribe's documentation has been prepared under my direction and personally reviewed by me in its entirety. I confirm that the note above accurately reflects all work, treatment, procedures, and medical decision making performed by me.       This note will not be viewable in [address]

## 2018-01-22 NOTE — PROGRESS NOTES
Danae Villarreal is a 38 y.o. female    Chief Complaint   Patient presents with    Hypertension     states having blurred vision, head pressure, and dizziness     1. Have you been to the ER, urgent care clinic since your last visit? Hospitalized since your last visit? Yes, went to [facility] on 1/21/18 for dizziness      2. Have you seen or consulted any other health care providers outside of the [address] [place] [address] since your last visit? Include any pap smears or colon screening.  No     Visit Vitals    /82 (BP 1 Location: Right arm, BP Patient Position: Sitting)    Pulse 99    Temp 98.2 °F (36.8 °C) (Oral)    Resp 18    Ht 5' 2\" (1.575 m)    Wt 325 lb 12.8 oz (147.8 kg)    SpO2 98%    BMI 59.59 kg/m2       Health Maintenance Due   Topic Date Due    Pneumococcal 19-64 Medium Risk (1 of 1 - PPSV23) 06/10/1996    DTaP/Tdap/Td series (1 - Tdap) 06/10/1998    PAP AKA CERVICAL CYTOLOGY  06/10/1998    Influenza Age 9 to Adult  08/01/2017

## 2018-01-22 NOTE — PROGRESS NOTES
José Lowery is a 38 y.o. female who presents for follow up. Last seen August 2015. Reports head pressure for the past few months. Worse over the past month. Feels dizzy. Describes presyncope. Denies vertigo. Felt nauseated, no emesis. Sees spot in both eyes. Occasional blurred vision. No diplopia. Normal CT head in October 2017. Seen in ED three times since October. In ED yesterday with dizziness.     Patient smokes 1ppd.      She reports prior depression/bipolar disorder. Not on any medications for mood now. Has 3 sons. Has reunited with 25year old son. Supportive  and son. 2 other sons.      Menses is irregular. She notes hair growth and acne. Distant pap. Has lymphedema. Starting to restrict salt.        Past Medical History:   Diagnosis Date    Back pain     Cough     Depression     GERD (gastroesophageal reflux disease)     Hemorrhoid     Irregular heart beat     SOB (shortness of breath)     Stress     Thromboembolus (HCC)     Tiredness     Wheezing        Family History   Problem Relation Age of Onset    Mental Retardation Mother     Heart Disease Mother     COPD Mother     Cancer Maternal Grandmother     Diabetes Maternal Grandmother        Social History     Social History    Marital status:      Spouse name: N/A    Number of children: N/A    Years of education: N/A     Occupational History    Not on file. Social History Main Topics    Smoking status: Current Every Day Smoker     Packs/day: 1.50    Smokeless tobacco: Current User    Alcohol use No    Drug use: No    Sexual activity: Yes     Partners: Male     Other Topics Concern    Not on file     Social History Narrative       Current Outpatient Prescriptions on File Prior to Visit   Medication Sig Dispense Refill    omeprazole (PRILOSEC OTC) 20 mg tablet Take 20 mg by mouth daily.  acetaminophen (TYLENOL) 325 mg tablet Take 650 mg by mouth every four (4) hours as needed for Pain. Indications: FEVER, PAIN       No current facility-administered medications on file prior to visit. Review of Systems  Pertinent items are noted in HPI. Objective:     Visit Vitals    /82 (BP 1 Location: Right arm, BP Patient Position: Sitting)    Pulse 99    Temp 98.2 °F (36.8 °C) (Oral)    Resp 18    Ht 5' 2\" (1.575 m)    Wt 325 lb 12.8 oz (147.8 kg)    SpO2 98%    BMI 59.59 kg/m2     Gen: well appearing female  HEENT:   PERRL,normal conjunctiva. External ear and canals normal, TMs no opacification or erythema,  OP no erythema, no exudates, MMM  Neck: No masses or LAD  Resp:  No wheezing, no rhonchi, no rales. CV:  RRR, normal S1S2, no murmur. GI: soft, nontender, without masses. No hepatosplenomegaly. Extrem:  +2 pulses, no edema, warm distally  Neuro- alert, normal gait, negative SLR, 5/5 motor in lower extremities, normal sensory      Assessment/Plan:     1. Dizziness-increase fluid intake. 2. Pressure in head-   For head pressure if it becomes a headache, tylenol 1-2 twice a day as needed       3. Screening for hyperlipidemia    - LIPID PANEL    4. Class 3 obesity without serious comorbidity with body mass index (BMI) of 50.0 to 59.9 in adult, unspecified obesity type ([address]- Recommend compliance with diabetic diet. Continue medications for diabetes. Monitor blood sugar readings as discussed. Keep up on regular diabetic eye exams.    - THYROID PANEL W/TSH    5. Screening for diabetes mellitus    - HEMOGLOBIN A1C W/O EAG    6. Floaters in visual field, bilateral-recommend eye exam.       Follow-up Disposition:  Return for follow up pending labs .     Solo Briggs MD

## 2018-01-22 NOTE — PATIENT INSTRUCTIONS
Dilated eye exam recommended. For head pressure if it becomes a headache, tylenol 1-2 twice a day as needed     [facility]  Dr. Jeri Marrero. [phone].

## 2018-01-22 NOTE — MR AVS SNAPSHOT
[address] [place] [phone] Patient: Alise Vanessa MRN: [id number] OTV:[id number] Visit Information Date & Time Provider Department Dept. Phone Encounter #  
 1/22/2018  2:00 PM Chari Perry, [address] [id number] Follow-up Instructions Return for follow up pending labs . Upcoming Health Maintenance Date Due Pneumococcal 19-64 Medium Risk (1 of 1 - PPSV23) 6/10/1996 DTaP/Tdap/Td series (1 - Tdap) 6/10/1998 PAP AKA CERVICAL CYTOLOGY 6/10/1998 Influenza Age 5 to Adult 8/1/2017 Allergies as of 1/22/2018  Review Complete On: 1/22/2018 By: Chari Perry MD  
  
 Severity Noted Reaction Type Reactions Pcn [Penicillins] High 05/13/2012    Anaphylaxis Doxycycline  11/06/2014    Hives Naprosyn [Naproxen]  08/19/2015    Nausea and Vomiting Current Immunizations  Never Reviewed No immunizations on file. Not reviewed this visit You Were Diagnosed With   
  
 Codes Comments Dizziness    -  Primary ICD-10-CM: P36 ICD-9-CM: 780.4 Pressure in head     ICD-10-CM: R51 ICD-9-CM: 784.0 Screening for hyperlipidemia     ICD-10-CM: Z13.220 ICD-9-CM: V77.91 Class 3 obesity without serious comorbidity with body mass index (BMI) of 50.0 to 59.9 in adult, unspecified obesity type ([facility]     ICD-10-CM: E66.9, Z68.43 
ICD-9-CM: 278.00, V85.43 Screening for diabetes mellitus     ICD-10-CM: Z13.1 ICD-9-CM: V77.1 Floaters in visual field, bilateral     ICD-10-CM: [id number] ICD-9-CM: 379.24 Vitals BP Pulse Temp Resp Height(growth percentile) Weight(growth percentile) 126/82 (BP 1 Location: Right arm, BP Patient Position: Sitting) 99 98.2 °F (36.8 °C) (Oral) 18 5' 2\" (1.575 m) 325 lb 12.8 oz (147.8 kg) SpO2 BMI OB Status Smoking Status 98% 59.59 kg/m2 Having regular periods Current Every Day Smoker BMI and BSA Data Body Mass Index Body Surface Area 59.59 kg/m 2 2.54 m 2 Preferred Pharmacy Pharmacy Name Phone [facility] [phone], [address] [place] [address] [phone] Your Updated Medication List  
  
   
This list is accurate as of: 1/22/18  2:52 PM.  Always use your most recent med list.  
  
  
  
  
 PriLOSEC OTC 20 mg tablet Generic drug:  omeprazole Take 20 mg by mouth daily. TYLENOL 325 mg tablet Generic drug:  acetaminophen Take 650 mg by mouth every four (4) hours as needed for Pain. Indications: FEVER, PAIN We Performed the Following HEMOGLOBIN A1C W/O EAG [97170 CPT(R)] LIPID PANEL [75072 CPT(R)] THYROID PANEL W/TSH [15983 CPT(R)] Follow-up Instructions Return for follow up pending labs . Patient Instructions Dilated eye exam recommended. For head pressure if it becomes a headache, tylenol 1-2 twice a day as needed [facility]  Dr. Sakshi Felder. [phone]. Introducing [facility]! Vinny Lpoez introduces [facility] patient portal. Now you can access parts of your medical record, email your doctor's office, and request medication refills online. 1. In your internet browser, go to [email or web address] 2. Click on the First Time User? Click Here link in the Sign In box. You will see the New Member Sign Up page. 3. Enter your [facility] Access Code exactly as it appears below. You will not need to use this code after youve completed the sign-up process. If you do not sign up before the expiration date, you must request a new code. · [facility] Access Code: [facility] Expires: 4/22/2018  3:28 AM 
 
4. Enter the last four digits of your Social Security Number (xxxx) and Date of Birth (mm/dd/yyyy) as indicated and click Submit. You will be taken to the next sign-up page. 5. Create a [facility] ID. This will be your [facility] login ID and cannot be changed, so think of one that is secure and easy to remember. 6. Create a [facility]ydia password. You can change your password at any time. 7. Enter your Password Reset Question and Answer. This can be used at a later time if you forget your password. 8. Enter your e-mail address. You will receive e-mail notification when new information is available in [address] 9. Click Sign Up. You can now view and download portions of your medical record. 10. Click the Download Summary menu link to download a portable copy of your medical information. If you have questions, please visit the Frequently Asked Questions section of the [employer] website. Remember, [facility] is NOT to be used for urgent needs. For medical emergencies, dial 911. Now available from your iPhone and Android! Please provide this summary of care documentation to your next provider. Your primary care clinician is listed as Greer Bahena. If you have any questions after today's visit, please call [phone].

## 2018-01-22 NOTE — ED NOTES
Pricilla Harden MD   has done a bedside review of the discharge instructions. The patient is in understanding. The patients line(s) are removed. The patient is dressed, and belongings together for discharge.

## 2018-01-24 ENCOUNTER — APPOINTMENT (OUTPATIENT)
Dept: INTERNAL MEDICINE CLINIC | Age: 43
End: 2018-01-24

## 2018-01-25 LAB
CHOLEST SERPL-MCNC: 197 MG/DL (ref 100–199)
FT4I SERPL CALC-MCNC: 2 (ref 1.2–4.9)
HBA1C MFR BLD: 5.3 % (ref 4.8–5.6)
HDLC SERPL-MCNC: 36 MG/DL
LDLC SERPL CALC-MCNC: 144 MG/DL (ref 0–99)
T3RU NFR SERPL: 24 % (ref 24–39)
T4 SERPL-MCNC: 8.5 UG/DL (ref 4.5–12)
TRIGL SERPL-MCNC: 84 MG/DL (ref 0–149)
TSH SERPL DL<=0.005 MIU/L-ACNC: 1.25 UIU/ML (ref 0.45–4.5)
VLDLC SERPL CALC-MCNC: 17 MG/DL (ref 5–40)

## 2018-08-21 ENCOUNTER — APPOINTMENT (OUTPATIENT)
Dept: CT IMAGING | Age: 43
End: 2018-08-21
Attending: EMERGENCY MEDICINE
Payer: SELF-PAY

## 2018-08-21 ENCOUNTER — HOSPITAL ENCOUNTER (EMERGENCY)
Age: 43
Discharge: HOME | End: 2018-08-21
Attending: EMERGENCY MEDICINE
Payer: SELF-PAY

## 2018-08-21 VITALS
TEMPERATURE: 98.2 F | OXYGEN SATURATION: 97 % | SYSTOLIC BLOOD PRESSURE: 131 MMHG | DIASTOLIC BLOOD PRESSURE: 84 MMHG | HEART RATE: 71 BPM | RESPIRATION RATE: 18 BRPM

## 2018-08-21 DIAGNOSIS — R51.9 ACUTE NONINTRACTABLE HEADACHE, UNSPECIFIED HEADACHE TYPE: Primary | ICD-10-CM

## 2018-08-21 LAB
ALBUMIN SERPL-MCNC: 3.6 G/DL (ref 3.5–5)
ALBUMIN/GLOB SERPL: 1 {RATIO} (ref 1.1–2.2)
ALP SERPL-CCNC: 58 U/L (ref 45–117)
ALT SERPL-CCNC: 24 U/L (ref 12–78)
AMPHET UR QL SCN: NEGATIVE
ANION GAP SERPL CALC-SCNC: 8 MMOL/L (ref 5–15)
APPEARANCE UR: CLEAR
AST SERPL-CCNC: 17 U/L (ref 15–37)
BARBITURATES UR QL SCN: NEGATIVE
BASOPHILS # BLD: 0.1 K/UL (ref 0–0.1)
BASOPHILS NFR BLD: 1 % (ref 0–1)
BENZODIAZ UR QL: NEGATIVE
BILIRUB SERPL-MCNC: 0.5 MG/DL (ref 0.2–1)
BILIRUB UR QL: NEGATIVE
BUN SERPL-MCNC: 13 MG/DL (ref 6–20)
BUN/CREAT SERPL: 16 (ref 12–20)
CALCIUM SERPL-MCNC: 8.6 MG/DL (ref 8.5–10.1)
CANNABINOIDS UR QL SCN: NEGATIVE
CHLORIDE SERPL-SCNC: 109 MMOL/L (ref 97–108)
CO2 SERPL-SCNC: 24 MMOL/L (ref 21–32)
COCAINE UR QL SCN: NEGATIVE
COLOR UR: NORMAL
COMMENT, HOLDF: NORMAL
CREAT SERPL-MCNC: 0.82 MG/DL (ref 0.55–1.02)
DIFFERENTIAL METHOD BLD: ABNORMAL
DRUG SCRN COMMENT,DRGCM: NORMAL
EOSINOPHIL # BLD: 0.2 K/UL (ref 0–0.4)
EOSINOPHIL NFR BLD: 2 % (ref 0–7)
ERYTHROCYTE [DISTWIDTH] IN BLOOD BY AUTOMATED COUNT: 12.8 % (ref 11.5–14.5)
GLOBULIN SER CALC-MCNC: 3.6 G/DL (ref 2–4)
GLUCOSE SERPL-MCNC: 94 MG/DL (ref 65–100)
GLUCOSE UR STRIP.AUTO-MCNC: NEGATIVE MG/DL
HCT VFR BLD AUTO: 36.2 % (ref 35–47)
HGB BLD-MCNC: 12 G/DL (ref 11.5–16)
HGB UR QL STRIP: NEGATIVE
IMM GRANULOCYTES # BLD: 0 K/UL (ref 0–0.04)
IMM GRANULOCYTES NFR BLD AUTO: 0 % (ref 0–0.5)
KETONES UR QL STRIP.AUTO: NEGATIVE MG/DL
LEUKOCYTE ESTERASE UR QL STRIP.AUTO: NEGATIVE
LYMPHOCYTES # BLD: 1.3 K/UL (ref 0.8–3.5)
LYMPHOCYTES NFR BLD: 16 % (ref 12–49)
MCH RBC QN AUTO: 31.2 PG (ref 26–34)
MCHC RBC AUTO-ENTMCNC: 33.1 G/DL (ref 30–36.5)
MCV RBC AUTO: 94 FL (ref 80–99)
METHADONE UR QL: NEGATIVE
MONOCYTES # BLD: 0.4 K/UL (ref 0–1)
MONOCYTES NFR BLD: 5 % (ref 5–13)
NEUTS SEG # BLD: 6.3 K/UL (ref 1.8–8)
NEUTS SEG NFR BLD: 76 % (ref 32–75)
NITRITE UR QL STRIP.AUTO: NEGATIVE
NRBC # BLD: 0 K/UL (ref 0–0.01)
NRBC BLD-RTO: 0 PER 100 WBC
OPIATES UR QL: NEGATIVE
PCP UR QL: NEGATIVE
PH UR STRIP: 5.5 [PH] (ref 5–8)
PLATELET # BLD AUTO: 219 K/UL (ref 150–400)
PMV BLD AUTO: 11.6 FL (ref 8.9–12.9)
POTASSIUM SERPL-SCNC: 3.9 MMOL/L (ref 3.5–5.1)
PROT SERPL-MCNC: 7.2 G/DL (ref 6.4–8.2)
PROT UR STRIP-MCNC: NEGATIVE MG/DL
RBC # BLD AUTO: 3.85 M/UL (ref 3.8–5.2)
SAMPLES BEING HELD,HOLD: NORMAL
SODIUM SERPL-SCNC: 141 MMOL/L (ref 136–145)
SP GR UR REFRACTOMETRY: <1.005 (ref 1–1.03)
UROBILINOGEN UR QL STRIP.AUTO: 0.2 EU/DL (ref 0.2–1)
WBC # BLD AUTO: 8.3 K/UL (ref 3.6–11)

## 2018-08-21 PROCEDURE — 74011250636 HC RX REV CODE- 250/636: Performed by: EMERGENCY MEDICINE

## 2018-08-21 PROCEDURE — 96361 HYDRATE IV INFUSION ADD-ON: CPT

## 2018-08-21 PROCEDURE — 70450 CT HEAD/BRAIN W/O DYE: CPT

## 2018-08-21 PROCEDURE — 70498 CT ANGIOGRAPHY NECK: CPT

## 2018-08-21 PROCEDURE — 70496 CT ANGIOGRAPHY HEAD: CPT

## 2018-08-21 PROCEDURE — 80053 COMPREHEN METABOLIC PANEL: CPT | Performed by: EMERGENCY MEDICINE

## 2018-08-21 PROCEDURE — 85025 COMPLETE CBC W/AUTO DIFF WBC: CPT | Performed by: EMERGENCY MEDICINE

## 2018-08-21 PROCEDURE — 74011636320 HC RX REV CODE- 636/320: Performed by: EMERGENCY MEDICINE

## 2018-08-21 PROCEDURE — 36415 COLL VENOUS BLD VENIPUNCTURE: CPT | Performed by: EMERGENCY MEDICINE

## 2018-08-21 PROCEDURE — 81003 URINALYSIS AUTO W/O SCOPE: CPT | Performed by: EMERGENCY MEDICINE

## 2018-08-21 PROCEDURE — 99283 EMERGENCY DEPT VISIT LOW MDM: CPT

## 2018-08-21 PROCEDURE — 74011000258 HC RX REV CODE- 258: Performed by: EMERGENCY MEDICINE

## 2018-08-21 PROCEDURE — 96374 THER/PROPH/DIAG INJ IV PUSH: CPT

## 2018-08-21 PROCEDURE — 80307 DRUG TEST PRSMV CHEM ANLYZR: CPT | Performed by: EMERGENCY MEDICINE

## 2018-08-21 RX ORDER — ONDANSETRON 2 MG/ML
4 INJECTION INTRAMUSCULAR; INTRAVENOUS
Status: COMPLETED | OUTPATIENT
Start: 2018-08-21 | End: 2018-08-21

## 2018-08-21 RX ORDER — PROCHLORPERAZINE EDISYLATE 5 MG/ML
10 INJECTION INTRAMUSCULAR; INTRAVENOUS
Status: DISCONTINUED | OUTPATIENT
Start: 2018-08-21 | End: 2018-08-21

## 2018-08-21 RX ORDER — PROCHLORPERAZINE EDISYLATE 5 MG/ML
10 INJECTION INTRAMUSCULAR; INTRAVENOUS
Status: DISCONTINUED | OUTPATIENT
Start: 2018-08-21 | End: 2018-08-21 | Stop reason: SDUPTHER

## 2018-08-21 RX ORDER — DIPHENHYDRAMINE HYDROCHLORIDE 50 MG/ML
50 INJECTION, SOLUTION INTRAMUSCULAR; INTRAVENOUS
Status: DISCONTINUED | OUTPATIENT
Start: 2018-08-21 | End: 2018-08-21

## 2018-08-21 RX ORDER — SODIUM CHLORIDE 0.9 % (FLUSH) 0.9 %
10 SYRINGE (ML) INJECTION
Status: COMPLETED | OUTPATIENT
Start: 2018-08-21 | End: 2018-08-21

## 2018-08-21 RX ADMIN — SODIUM CHLORIDE 1000 ML: 900 INJECTION, SOLUTION INTRAVENOUS at 19:12

## 2018-08-21 RX ADMIN — ONDANSETRON 4 MG: 2 INJECTION INTRAMUSCULAR; INTRAVENOUS at 19:12

## 2018-08-21 RX ADMIN — SODIUM CHLORIDE 100 ML: 900 INJECTION, SOLUTION INTRAVENOUS at 19:52

## 2018-08-21 RX ADMIN — IOPAMIDOL 100 ML: 755 INJECTION, SOLUTION INTRAVENOUS at 19:52

## 2018-08-21 RX ADMIN — Medication 10 ML: at 19:52

## 2018-08-21 NOTE — ED TRIAGE NOTES
TRIAGE NOTE:  Patient arrives with c/o \"ever since 12 today I started with head pressure, and sinus pressure, and I've had some dizziness. While I was walking around in [employer] I started sweating, my vision has been blurry for awhile, I have floaters in my eyes every day. I think I have diabetes i'm getting sores that don't heal, i'm peeing more, and I have scaly skin\".

## 2018-08-21 NOTE — ED NOTES
6:22 PM  I have evaluated the patient as the Provider in Triage. I have reviewed Her vital signs and the triage nurse assessment. I have talked with the patient and any available family and advised that I am the provider in triage and have ordered the appropriate study to initiate their work up based on the clinical presentation during my assessment. I have advised that the patient will be accommodated in the Main ED as soon as possible. I have also requested to contact the triage nurse or myself immediately if the patient experiences any changes in their condition during this brief waiting period.   Francisca Mcgrath, WILLOW      Pt reports severe headache since around 12noon today; felt like her head was \"going to explode\"

## 2018-08-21 NOTE — ED PROVIDER NOTES
HPI Comments: 41 y.o. female with past medical history significant for thromboembolus, GERD, and depression who presents from home via private vehicle with chief complaint of headache. Pt reports onset \"[id number]\" this morning of 3/10 headache and lightheadedness which at 1500 this afternoon worsened accompanied by nausea, diaphoresis, and \"impending sense of doom\". Pt also reports recent occurrences of polydipsia and polyuria, as well as blurry vision and \"floaters\", which has been occurring for the past several months and is concerned about having diabetes, but has had negative testing thus far. Pt states she has an appointment with PCP this Friday to discuss symptoms. Pt reports history of lymphedema in lower extremities. Pt denies any fever, chills, BRANDON, or numbness in extremities. There are no other acute medical concerns at this time. Social hx: Current smoker (1.5 packs/day); No EtOH use  PCP: Alma Julio MD    Note written by Chavo Phelps, as dictated by Lele Sanches MD 6:53 PM    The history is provided by the patient. No  was used. Past Medical History:   Diagnosis Date    Back pain     Cough     Depression     GERD (gastroesophageal reflux disease)     Hemorrhoid     Irregular heart beat     SOB (shortness of breath)     Stress     Thromboembolus (HCC)     Tiredness     Wheezing        History reviewed. No pertinent surgical history. Family History:   Problem Relation Age of Onset    Mental Retardation Mother     Heart Disease Mother     COPD Mother     Cancer Maternal Grandmother     Diabetes Maternal Grandmother        Social History     Social History    Marital status:      Spouse name: N/A    Number of children: N/A    Years of education: N/A     Occupational History    Not on file.      Social History Main Topics    Smoking status: Current Every Day Smoker     Packs/day: 1.50    Smokeless tobacco: Current User   Uriah Espinal Alcohol use No    Drug use: No    Sexual activity: Yes     Partners: Male     Other Topics Concern    Not on file     Social History Narrative         ALLERGIES: Pcn [penicillins]; Bactrim [sulfamethoprim]; Doxycycline; and Naprosyn [naproxen]    Review of Systems   Constitutional: Positive for diaphoresis. Negative for appetite change, chills and fever. Eyes: Positive for visual disturbance. Respiratory: Negative for shortness of breath. Cardiovascular: Negative for chest pain. Gastrointestinal: Negative for abdominal pain, constipation, diarrhea and vomiting. Endocrine: Positive for polydipsia and polyuria. Neurological: Positive for light-headedness and headaches. Negative for dizziness and numbness. All other systems reviewed and are negative. Vitals:    08/21/18 1817   BP: 131/84   Pulse: 71   Resp: 18   Temp: 98.2 °F (36.8 °C)   SpO2: 97%            Physical Exam   Constitutional: She is oriented to person, place, and time. She appears well-developed. No distress. HENT:   Head: Normocephalic and atraumatic. Eyes: Pupils are equal, round, and reactive to light. No scleral icterus. Neck: Normal range of motion. Neck supple. Cardiovascular: Normal rate and regular rhythm. Pulmonary/Chest: Effort normal and breath sounds normal.   Abdominal: Soft. She exhibits no distension. There is no tenderness. There is no rebound and no guarding. Musculoskeletal: Normal range of motion. Neurological: She is alert and oriented to person, place, and time. She has normal strength. No sensory deficit. Coordination normal.   No motor or sensory deficits noted. Normal coordination. Skin: Skin is warm and dry. She is not diaphoretic. Psychiatric: She has a normal mood and affect. Her behavior is normal. Thought content normal.   Nursing note and vitals reviewed.     Note written by Tracey Lewis, as dictated by Oleg Peraza MD 6:53 PM     MDM  Number of Diagnoses or Management Options  Acute nonintractable headache, unspecified headache type: new and requires workup  Diagnosis management comments: The patient presented to the emergency department with a headache. The patient is now resting comfortably and feels better, is alert, talkative, interactive and in no distress. The patient appears well and is able to tolerate PO fluids. The repeat examination is unremarkable and benign. The patient is neurologically intact, has a normal mental status, and is ambulatory in the ED. The history, exam, diagnostic testing (if any) and the patient's current condition do not suggest meningitis, stroke, sepsis, subarachnoid hemorrhage, intracranial bleeding, encephalitis, temporal arteritis or other significant pathology to warrant further testing, continued ED treatment, admission, neurological consultation, or other specialist evaluation at this point. The vital signs have been stable. The patient's condition is stable and appropriate for discharge. The patient will pursue further outpatient evaluation with the primary care physician or other designated or consulting physician as indicated in the discharge instructions. ED Course       Procedures    The patient's results have been reviewed with them and/or available family. Patient and/or family verbally conveyed their understanding and agreement of the patient's signs, symptoms, diagnosis, treatment and prognosis and additionally agree to follow up as recommended in the discharge instructions or to return to the Emergency Room should their condition change prior to their follow-up appointment. The patient/family verbally agrees with the care-plan and verbally conveys that all of their questions have been answered.  The discharge instructions have also been provided to the patient and/or family with some educational information regarding the patient's diagnosis as well a list of reasons why the patient would want to return to the ER prior to their follow-up appointment, should their condition change.

## 2018-08-22 NOTE — DISCHARGE INSTRUCTIONS

## 2018-08-22 NOTE — ED NOTES
Discharge instructions given to pt by Dr. Piotr Mcgrath. Opportunity for questions provided. Pt ambulatory out of unit, in no acute distress and taken home by family.

## 2018-08-24 ENCOUNTER — OFFICE VISIT (OUTPATIENT)
Dept: INTERNAL MEDICINE CLINIC | Age: 43
End: 2018-08-24

## 2018-08-24 VITALS
WEIGHT: 293 LBS | DIASTOLIC BLOOD PRESSURE: 77 MMHG | TEMPERATURE: 98 F | BODY MASS INDEX: 53.92 KG/M2 | HEART RATE: 66 BPM | HEIGHT: 62 IN | RESPIRATION RATE: 16 BRPM | SYSTOLIC BLOOD PRESSURE: 135 MMHG | OXYGEN SATURATION: 98 %

## 2018-08-24 DIAGNOSIS — N92.6 IRREGULAR MENSES: ICD-10-CM

## 2018-08-24 DIAGNOSIS — R21 RASH: Primary | ICD-10-CM

## 2018-08-24 RX ORDER — MOMETASONE FUROATE 1 MG/G
CREAM TOPICAL DAILY
Qty: 15 G | Refills: 2 | Status: SHIPPED | OUTPATIENT
Start: 2018-08-24 | End: 2019-11-03

## 2018-08-24 NOTE — PROGRESS NOTES
Reviewed record in preparation for visit and have obtained necessary documentation. Identified pt with two pt identifiers(name and ). Chief Complaint   Patient presents with    Other     \"chills in her head\"    Visual Problems    Diabetes    Skin Problem     dry elbows        Health Maintenance Due   Topic Date Due    Pneumococcal Vaccine (1 of 1 - PPSV23) 06/10/1996    DTaP/Tdap/Td  (1 - Tdap) 06/10/1998    Cervical Cancer Screening  06/10/1998    Flu Vaccine  2018       Ms. Tabitha Lorenzo has a reminder for a \"due or due soon\" health maintenance. I have asked that she discuss this further with her primary care provider for follow-up on this health maintenance. Coordination of Care Questionnaire:  :     1) Have you been to an emergency room, urgent care clinic since your last visit? yes   Hospitalized since your last visit? no             2) Have you seen or consulted any other health care providers outside of [address] [place] [address] since your last visit? no  (Include any pap smears or colon screenings in this section.)    3) In the event something were to happen to you and you were unable to speak on your behalf, do you have an Advance Directive/ Living Will in place stating your wishes? YES    Do you have an Advance Directive on file? no    4) Are you interested in receiving information on Advance Directives? NO    Patient is accompanied by son I have received verbal consent from Chana Gayle to discuss any/all medical information while they are present in the room.

## 2018-08-24 NOTE — PROGRESS NOTES
Gary Schwarz is a 41 y.o. female who presents with concern of visual symptoms. In with son. last seen Jan 2018. Due for eye exam.  Notes slight blurred vision and daily floaters. No eye pain, no discharge. No contacts or glasses. Occasional head pressure and dizziness. Seen in ED on 8/21 with headaches, dizziness, nausea. Normal CT head in October 2017 and again on 8/21. Had CTA neck and head, normal.  niormal labs. Reports dryness of both elbows.       She reports prior depression/bipolar disorder. Not on any medications for mood now. Has 3 sons. Has reunited with 25year old son. Supportive  and son. 2 other sons.        Past Medical History:   Diagnosis Date    Back pain     Cough     Depression     GERD (gastroesophageal reflux disease)     Hemorrhoid     Irregular heart beat     SOB (shortness of breath)     Stress     Thromboembolus (HCC)     Tiredness     Wheezing        Family History   Problem Relation Age of Onset    Mental Retardation Mother     Heart Disease Mother     COPD Mother     Cancer Maternal Grandmother     Diabetes Maternal Grandmother        Social History     Social History    Marital status:      Spouse name: N/A    Number of children: N/A    Years of education: N/A     Occupational History    Not on file. Social History Main Topics    Smoking status: Current Every Day Smoker     Packs/day: 1.50    Smokeless tobacco: Current User    Alcohol use No    Drug use: No    Sexual activity: Yes     Partners: Male     Other Topics Concern    Not on file     Social History Narrative       Current Outpatient Prescriptions on File Prior to Visit   Medication Sig Dispense Refill    omeprazole (PRILOSEC OTC) 20 mg tablet Take 20 mg by mouth daily.  acetaminophen (TYLENOL) 325 mg tablet Take 650 mg by mouth every four (4) hours as needed for Pain. Indications:  FEVER, PAIN       No current facility-administered medications on file prior to visit. Review of Systems  Pertinent items are noted in HPI. Objective:     Visit Vitals    /77 (BP 1 Location: Left arm, BP Patient Position: Sitting)    Pulse 66    Temp 98 °F (36.7 °C) (Oral)    Resp 16    Ht 5' 2\" (1.575 m)    Wt 328 lb (148.8 kg)    SpO2 98%    BMI 59.99 kg/m2     Gen: well appearing female  Resp:  No wheezing, no rhonchi, no rales. CV:  RRR, normal S1S2, no murmur. GI: soft, nontender, without masses. Extrem:  +2 pulses, no edema, warm distally  Skin: erythema on left lower leg, xerosis on elbows. Assessment/Plan:       ICD-10-CM ICD-9-CM    1. Rash R21 782.1 mometasone (ELOCON) 0.1 % topical cream   2. Irregular menses N92.6 626.4 REFERRAL TO OBSTETRICS AND GYNECOLOGY   FOR ELBOWS AND FOR LEFT LEG SORE, USE THE STEROID CREAM TWICE A DAY FOR UP TO 2 WEEKS, THEN USE VASELINE AT BEDTIME TO MOISTURIZE. Follow-up Disposition:  Return if symptoms worsen or fail to improve.     Jorge Bills MD

## 2018-08-24 NOTE — PATIENT INSTRUCTIONS
[facility] EYE CLINIC, EYE EXAM.     FOR ELBOWS AND FOR LEFT LEG SORE, USE THE STEROID CREAM TWICE A DAY FOR UP TO 2 WEEKS, THEN USE VASELINE AT BEDTIME TO MOISTURIZE.

## 2018-08-24 NOTE — MR AVS SNAPSHOT
[address] 
[phone] Patient: Gina Blancas MRN: [id number] Visit Information Date & Time Provider Department Dept. Phone Encounter #  
 8/24/2018  9:30 AM Trinity Allen, [address] [id number] Follow-up Instructions Return if symptoms worsen or fail to improve. Upcoming Health Maintenance Date Due Pneumococcal 19-64 Medium Risk (1 of 1 - PPSV23) 6/10/1996 DTaP/Tdap/Td series (1 - Tdap) 6/10/1998 PAP AKA CERVICAL CYTOLOGY 6/10/1998 Influenza Age 5 to Adult 8/1/2018 Allergies as of 8/24/2018  Review Complete On: 8/24/2018 By: Trinity Allen MD  
  
 Severity Noted Reaction Type Reactions Pcn [Penicillins] High 05/13/2012    Anaphylaxis Bactrim [Sulfamethoprim]  08/21/2018    Hives Doxycycline  11/06/2014    Hives Naprosyn [Naproxen]  08/19/2015    Nausea and Vomiting Current Immunizations  Never Reviewed No immunizations on file. Not reviewed this visit You Were Diagnosed With   
  
 Codes Comments Rash    -  Primary ICD-10-CM: R21 
ICD-9-CM: 782.1 Irregular menses     ICD-10-CM: N92.6 ICD-9-CM: 626.4 Vitals BP Pulse Temp Resp Height(growth percentile) Weight(growth percentile) 135/77 (BP 1 Location: Left arm, BP Patient Position: Sitting) 66 98 °F (36.7 °C) (Oral) 16 5' 2\" (1.575 m) 328 lb (148.8 kg) SpO2 BMI OB Status Smoking Status 98% 59.99 kg/m2 Unknown Current Every Day Smoker Vitals History BMI and BSA Data Body Mass Index Body Surface Area  
 59.99 kg/m 2 2.55 m 2 [phone], [address] AT [facility] [phone] Your Updated Medication List  
  
   
 This list is accurate as of 8/24/18 10:33 AM.  Always use your most recent med list.  
  
  
  
  
 mometasone 0.1 % topical cream  
Commonly known as:  Alvarado Sidzeny Apply  to affected area daily. PriLOSEC OTC 20 mg tablet Generic drug:  omeprazole Take 20 mg by mouth daily. TYLENOL 325 mg tablet Generic drug:  acetaminophen Take 650 mg by mouth every four (4) hours as needed for Pain. Indications: FEVER, PAIN Prescriptions Printed Refills  
 mometasone (ELOCON) 0.1 % topical cream 2 Sig: Apply  to affected area daily. Class: Print Route: Topical  
  
We Performed the Following REFERRAL TO OBSTETRICS AND GYNECOLOGY [REF51 Custom] Comments:  
 Pelvic exam  
  
Follow-up Instructions Return if symptoms worsen or fail to improve. Referral Information Referral ID Referred By Referred To  
  
 [id number] Dane Ramon MD   
   [address] [facility] [place], [address] [place] [address] Phone: [phone] Fax: [phone] Visits Status Start Date End Date 1 New Request 8/24/18 8/24/19 If your referral has a status of pending review or denied, additional information will be sent to support the outcome of this decision. Patient Instructions [facility] EYE CLINIC, EYE EXAM. FOR ELBOWS AND FOR LEFT LEG SORE, USE THE STEROID CREAM TWICE A DAY FOR UP TO 2 WEEKS, THEN USE VASELINE AT BEDTIME TO MOISTURIZE. Introducing [facility]! [facility] introduces [employer] patient portal. Now you can access parts of your medical record, email your doctor's office, and request medication refills online. 1. In your internet browser, go to [email or web address] 2. Click on the First Time User? Click Here link in the Sign In box. You will see the New Member Sign Up page. 3. Enter your [employer] Access Code exactly as it appears below.  You will not need to use this code after youve completed the sign-up process. If you do not sign up before the expiration date, you must request a new code. · [employer] Access Code: [email or web address] Expires: 11/19/2018  6:15 PM 
 
4. Enter the last four digits of your Social Security Number (xxxx) and Date of Birth (mm/dd/yyyy) as indicated and click Submit. You will be taken to the next sign-up page. 5. Create a [employer] ID. This will be your [employer] login ID and cannot be changed, so think of one that is secure and easy to remember. 6. Create a [employer] password. You can change your password at any time. 7. Enter your Password Reset Question and Answer. This can be used at a later time if you forget your password. 8. Enter your e-mail address. You will receive e-mail notification when new information is available in [address]. 9. Click Sign Up. You can now view and download portions of your medical record. 10. Click the Download Summary menu link to download a portable copy of your medical information. If you have questions, please visit the Frequently Asked Questions section of the [employer] website. Remember, [employer] is NOT to be used for urgent needs. For medical emergencies, dial 911. Now available from your iPhone and Android! Please provide this summary of care documentation to your next provider. Your primary care clinician is listed as Gurpreet Nathan. If you have any questions after today's visit, please call [phone].

## 2018-08-29 ENCOUNTER — TELEPHONE (OUTPATIENT)
Dept: INTERNAL MEDICINE CLINIC | Age: 43
End: 2018-08-29

## 2018-10-28 PROCEDURE — 99284 EMERGENCY DEPT VISIT MOD MDM: CPT

## 2018-10-28 PROCEDURE — 77030029684 HC NEB SM VOL KT MONA -A

## 2018-10-28 PROCEDURE — 93005 ELECTROCARDIOGRAM TRACING: CPT

## 2018-10-29 ENCOUNTER — HOSPITAL ENCOUNTER (EMERGENCY)
Age: 43
Discharge: HOME | End: 2018-10-29
Attending: EMERGENCY MEDICINE
Payer: SELF-PAY

## 2018-10-29 ENCOUNTER — APPOINTMENT (OUTPATIENT)
Dept: GENERAL RADIOLOGY | Age: 43
End: 2018-10-29
Attending: EMERGENCY MEDICINE
Payer: SELF-PAY

## 2018-10-29 VITALS
RESPIRATION RATE: 17 BRPM | DIASTOLIC BLOOD PRESSURE: 40 MMHG | SYSTOLIC BLOOD PRESSURE: 110 MMHG | WEIGHT: 293 LBS | BODY MASS INDEX: 53.92 KG/M2 | HEIGHT: 62 IN | TEMPERATURE: 98.1 F | HEART RATE: 80 BPM | OXYGEN SATURATION: 97 %

## 2018-10-29 DIAGNOSIS — J20.9 ACUTE BRONCHITIS, UNSPECIFIED ORGANISM: Primary | ICD-10-CM

## 2018-10-29 LAB
ALBUMIN SERPL-MCNC: 3.5 G/DL (ref 3.5–5)
ALBUMIN/GLOB SERPL: 0.9 {RATIO} (ref 1.1–2.2)
ALP SERPL-CCNC: 62 U/L (ref 45–117)
ALT SERPL-CCNC: 24 U/L (ref 12–78)
ANION GAP SERPL CALC-SCNC: 10 MMOL/L (ref 5–15)
AST SERPL-CCNC: 17 U/L (ref 15–37)
ATRIAL RATE: 82 BPM
BASOPHILS # BLD: 0.1 K/UL (ref 0–0.1)
BASOPHILS NFR BLD: 1 % (ref 0–1)
BILIRUB SERPL-MCNC: 0.3 MG/DL (ref 0.2–1)
BUN SERPL-MCNC: 17 MG/DL (ref 6–20)
BUN/CREAT SERPL: 21 (ref 12–20)
CALCIUM SERPL-MCNC: 8.5 MG/DL (ref 8.5–10.1)
CALCULATED P AXIS, ECG09: 61 DEGREES
CALCULATED R AXIS, ECG10: 55 DEGREES
CALCULATED T AXIS, ECG11: 45 DEGREES
CHLORIDE SERPL-SCNC: 107 MMOL/L (ref 97–108)
CK SERPL-CCNC: 117 U/L (ref 26–192)
CO2 SERPL-SCNC: 24 MMOL/L (ref 21–32)
CREAT SERPL-MCNC: 0.82 MG/DL (ref 0.55–1.02)
DIAGNOSIS, 93000: NORMAL
DIFFERENTIAL METHOD BLD: NORMAL
EOSINOPHIL # BLD: 0.3 K/UL (ref 0–0.4)
EOSINOPHIL NFR BLD: 3 % (ref 0–7)
ERYTHROCYTE [DISTWIDTH] IN BLOOD BY AUTOMATED COUNT: 12.4 % (ref 11.5–14.5)
GLOBULIN SER CALC-MCNC: 3.8 G/DL (ref 2–4)
GLUCOSE SERPL-MCNC: 103 MG/DL (ref 65–100)
HCT VFR BLD AUTO: 37.7 % (ref 35–47)
HGB BLD-MCNC: 12.6 G/DL (ref 11.5–16)
IMM GRANULOCYTES # BLD: 0 K/UL (ref 0–0.04)
IMM GRANULOCYTES NFR BLD AUTO: 0 % (ref 0–0.5)
LYMPHOCYTES # BLD: 1.9 K/UL (ref 0.8–3.5)
LYMPHOCYTES NFR BLD: 22 % (ref 12–49)
MCH RBC QN AUTO: 31 PG (ref 26–34)
MCHC RBC AUTO-ENTMCNC: 33.4 G/DL (ref 30–36.5)
MCV RBC AUTO: 92.6 FL (ref 80–99)
MONOCYTES # BLD: 0.5 K/UL (ref 0–1)
MONOCYTES NFR BLD: 5 % (ref 5–13)
NEUTS SEG # BLD: 6.2 K/UL (ref 1.8–8)
NEUTS SEG NFR BLD: 69 % (ref 32–75)
NRBC # BLD: 0 K/UL (ref 0–0.01)
NRBC BLD-RTO: 0 PER 100 WBC
P-R INTERVAL, ECG05: 170 MS
PLATELET # BLD AUTO: 218 K/UL (ref 150–400)
PMV BLD AUTO: 11.4 FL (ref 8.9–12.9)
POTASSIUM SERPL-SCNC: 3.8 MMOL/L (ref 3.5–5.1)
PROT SERPL-MCNC: 7.3 G/DL (ref 6.4–8.2)
Q-T INTERVAL, ECG07: 394 MS
QRS DURATION, ECG06: 86 MS
QTC CALCULATION (BEZET), ECG08: 460 MS
RBC # BLD AUTO: 4.07 M/UL (ref 3.8–5.2)
SODIUM SERPL-SCNC: 141 MMOL/L (ref 136–145)
TROPONIN I SERPL-MCNC: <0.05 NG/ML
VENTRICULAR RATE, ECG03: 82 BPM
WBC # BLD AUTO: 8.9 K/UL (ref 3.6–11)

## 2018-10-29 PROCEDURE — 74011636637 HC RX REV CODE- 636/637: Performed by: EMERGENCY MEDICINE

## 2018-10-29 PROCEDURE — 36415 COLL VENOUS BLD VENIPUNCTURE: CPT | Performed by: EMERGENCY MEDICINE

## 2018-10-29 PROCEDURE — 84484 ASSAY OF TROPONIN QUANT: CPT | Performed by: EMERGENCY MEDICINE

## 2018-10-29 PROCEDURE — 94640 AIRWAY INHALATION TREATMENT: CPT

## 2018-10-29 PROCEDURE — 74011250637 HC RX REV CODE- 250/637: Performed by: EMERGENCY MEDICINE

## 2018-10-29 PROCEDURE — 74011000250 HC RX REV CODE- 250: Performed by: EMERGENCY MEDICINE

## 2018-10-29 PROCEDURE — 85025 COMPLETE CBC W/AUTO DIFF WBC: CPT | Performed by: EMERGENCY MEDICINE

## 2018-10-29 PROCEDURE — 80053 COMPREHEN METABOLIC PANEL: CPT | Performed by: EMERGENCY MEDICINE

## 2018-10-29 PROCEDURE — 82550 ASSAY OF CK (CPK): CPT | Performed by: EMERGENCY MEDICINE

## 2018-10-29 PROCEDURE — 71046 X-RAY EXAM CHEST 2 VIEWS: CPT

## 2018-10-29 RX ORDER — PREDNISONE 10 MG/1
TABLET ORAL
Qty: 21 TAB | Refills: 0 | Status: SHIPPED | OUTPATIENT
Start: 2018-10-29 | End: 2018-11-28

## 2018-10-29 RX ORDER — ALBUTEROL SULFATE 90 UG/1
2 AEROSOL, METERED RESPIRATORY (INHALATION)
Qty: 1 INHALER | Refills: 0 | Status: SHIPPED | OUTPATIENT
Start: 2018-10-29 | End: 2018-11-28

## 2018-10-29 RX ORDER — PREDNISONE 20 MG/1
60 TABLET ORAL
Status: COMPLETED | OUTPATIENT
Start: 2018-10-29 | End: 2018-10-29

## 2018-10-29 RX ORDER — BENZONATATE 100 MG/1
100 CAPSULE ORAL
Qty: 20 CAP | Refills: 0 | Status: SHIPPED | OUTPATIENT
Start: 2018-10-29 | End: 2018-11-05

## 2018-10-29 RX ORDER — BENZONATATE 100 MG/1
100 CAPSULE ORAL
Status: DISCONTINUED | OUTPATIENT
Start: 2018-10-29 | End: 2018-10-29 | Stop reason: HOSPADM

## 2018-10-29 RX ORDER — IPRATROPIUM BROMIDE AND ALBUTEROL SULFATE 2.5; .5 MG/3ML; MG/3ML
3 SOLUTION RESPIRATORY (INHALATION) ONCE
Status: COMPLETED | OUTPATIENT
Start: 2018-10-29 | End: 2018-10-29

## 2018-10-29 RX ADMIN — BENZONATATE 100 MG: 100 CAPSULE ORAL at 03:31

## 2018-10-29 RX ADMIN — IPRATROPIUM BROMIDE AND ALBUTEROL SULFATE 3 ML: .5; 3 SOLUTION RESPIRATORY (INHALATION) at 03:31

## 2018-10-29 RX ADMIN — PREDNISONE 60 MG: 20 TABLET ORAL at 03:30

## 2018-10-29 NOTE — ED PROVIDER NOTES
EMERGENCY DEPARTMENT HISTORY AND PHYSICAL EXAM 
 
 
Date: 10/29/2018 Patient Name: Paulo Stafford History of Presenting Illness Chief Complaint Patient presents with  Chest Pain History Provided By: Patient HPI: Paulo Stafford is a 41 y.o. female, who presents ambulatory to the ED c/o persistent dry cough with associated worsening mid-sternal chest pain x1 week. Pt states she was seen by her PCP and diagnosed with bronchitis. She notes she was given a prescription for a Z-pack, but did not fill it. Pt denies taking any other medications to modify her sxs. Pt states her last menstrual cycle was last month. She specifically denies any recent fever, chills, nausea, vomiting, diarrhea, abd pain, SOB, lightheadedness, dizziness, numbness, weakness, tingling, BLE swelling, HA, heart palpitations, urinary sxs, changes in BM, changes in PO intake, melena, hematochezia or congestion. PCP: Jaime Mohan MD 
 
PMHx: Significant for GERD, chronic back pain, depression, vertigo PSHx: pt denies Social Hx: +tobacco (1 ppd), -EtOH, -Illicit Drugs There are no other complaints, changes, or physical findings at this time. Current Facility-Administered Medications Medication Dose Route Frequency Provider Last Rate Last Dose  benzonatate (TESSALON) capsule 100 mg  100 mg Oral TID PRN Lolita William MD   100 mg at 10/29/18 2850 Current Outpatient Medications Medication Sig Dispense Refill  albuterol (PROVENTIL HFA, VENTOLIN HFA, PROAIR HFA) 90 mcg/actuation inhaler Take 2 Puffs by inhalation every four (4) hours as needed for Wheezing. 1 Inhaler 0  predniSONE (STERAPRED DS) 10 mg dose pack As directed 21 Tab 0  
 benzonatate (TESSALON PERLES) 100 mg capsule Take 1 Cap by mouth three (3) times daily as needed for Cough for up to 7 days. 20 Cap 0  
 azithromycin (ZITHROMAX) 250 mg tablet Take 1 Tab by mouth See Admin Instructions.  6 Tab 0  
  mometasone (ELOCON) 0.1 % topical cream Apply  to affected area daily. 15 g 2  
 omeprazole (PRILOSEC OTC) 20 mg tablet Take 20 mg by mouth daily.  acetaminophen (TYLENOL) 325 mg tablet Take 650 mg by mouth every four (4) hours as needed for Pain. Indications: FEVER, PAIN Past History Past Medical History: 
Past Medical History:  
Diagnosis Date  Back pain  Cough  Depression  GERD (gastroesophageal reflux disease)  Hemorrhoid  Irregular heart beat  SOB (shortness of breath)  Stress  Thromboembolus ([address]  Tiredness  Wheezing Past Surgical History: 
History reviewed. No pertinent surgical history. Family History: 
Family History Problem Relation Age of Onset  Mental Retardation Mother  Heart Disease Mother  COPD Mother  Cancer Maternal Grandmother  Diabetes Maternal Grandmother Social History: 
Social History Tobacco Use  Smoking status: Current Every Day Smoker Packs/day: 1.50  Smokeless tobacco: Current User Substance Use Topics  Alcohol use: No  
 Drug use: No  
 
 
Allergies: Allergies Allergen Reactions  Pcn [Penicillins] Anaphylaxis  Bactrim [Sulfamethoprim] Hives  Doxycycline Hives  Naprosyn [Naproxen] Nausea and Vomiting Review of Systems Review of Systems Constitutional: Negative for chills and fever. HENT: Negative for congestion, ear pain, rhinorrhea and sore throat. Respiratory: Positive for cough. Negative for shortness of breath. Cardiovascular: Positive for chest pain. Negative for palpitations and leg swelling. Gastrointestinal: Negative for abdominal pain, constipation, diarrhea, nausea and vomiting. No melena No hematochezia Endocrine: Negative for polyuria. Genitourinary: Negative for dysuria, frequency and hematuria. Neurological: Negative for dizziness, weakness, light-headedness, numbness and headaches. No tingling All other systems reviewed and are negative. Physical Exam  
Physical Exam  
Nursing note and vitals reviewed. General appearance - overweight, well appearing, and in no distress Eyes - pupils equal and reactive, extraocular eye movements intact ENT - mucous membranes moist, pharynx normal without lesions Neck - supple, no significant adenopathy; non-tender to palpation Chest - expiratory wheezes, no rales or rhonchi; non-tender to palpation Heart - normal rate and regular rhythm, S1 and S2 normal, no murmurs noted Abdomen - soft, nontender, nondistended, no masses or organomegaly Musculoskeletal - no joint tenderness, deformity or swelling; normal ROM Extremities - peripheral pulses normal, 1+ pitting edema BLE Skin - normal coloration and turgor, no rashes Neurological - alert, oriented x3, normal speech, no focal findings or movement disorder noted Written by Chance Umana ED Scribe, as dictated by Barbi Miller MD 
 
Diagnostic Study Results Labs - Recent Results (from the past 12 hour(s)) EKG, 12 LEAD, INITIAL Collection Time: 10/28/18 11:57 PM  
Result Value Ref Range Ventricular Rate 82 BPM  
 Atrial Rate 82 BPM  
 P-R Interval 170 ms QRS Duration 86 ms  
 Q-T Interval 394 ms QTC Calculation (Bezet) 460 ms Calculated P Axis 61 degrees Calculated R Axis 55 degrees Calculated T Axis 45 degrees Diagnosis Normal sinus rhythm Normal ECG When compared with ECG of 21-JAN-2018 23:53, No significant change was found CBC WITH AUTOMATED DIFF Collection Time: 10/29/18 12:10 AM  
Result Value Ref Range WBC 8.9 3.6 - 11.0 K/uL  
 RBC 4.07 3.80 - 5.20 M/uL  
 HGB 12.6 11.5 - 16.0 g/dL HCT 37.7 35.0 - 47.0 % MCV 92.6 80.0 - 99.0 FL  
 MCH 31.0 26.0 - 34.0 PG  
 MCHC 33.4 30.0 - 36.5 g/dL  
 RDW 12.4 11.5 - 14.5 % PLATELET 324 077 - 950 K/uL MPV 11.4 8.9 - 12.9 FL  
 NRBC 0.0 0  WBC ABSOLUTE NRBC 0.00 0.00 - 0.01 K/uL NEUTROPHILS 69 32 - 75 % LYMPHOCYTES 22 12 - 49 % MONOCYTES 5 5 - 13 % EOSINOPHILS 3 0 - 7 % BASOPHILS 1 0 - 1 % IMMATURE GRANULOCYTES 0 0.0 - 0.5 % ABS. NEUTROPHILS 6.2 1.8 - 8.0 K/UL  
 ABS. LYMPHOCYTES 1.9 0.8 - 3.5 K/UL  
 ABS. MONOCYTES 0.5 0.0 - 1.0 K/UL  
 ABS. EOSINOPHILS 0.3 0.0 - 0.4 K/UL  
 ABS. BASOPHILS 0.1 0.0 - 0.1 K/UL  
 ABS. IMM. GRANS. 0.0 0.00 - 0.04 K/UL  
 DF AUTOMATED METABOLIC PANEL, COMPREHENSIVE Collection Time: 10/29/18 12:10 AM  
Result Value Ref Range Sodium 141 136 - 145 mmol/L Potassium 3.8 3.5 - 5.1 mmol/L Chloride 107 97 - 108 mmol/L  
 CO2 24 21 - 32 mmol/L Anion gap 10 5 - 15 mmol/L Glucose 103 (H) 65 - 100 mg/dL BUN 17 6 - 20 MG/DL Creatinine 0.82 0.55 - 1.02 MG/DL  
 BUN/Creatinine ratio 21 (H) 12 - 20 GFR est AA >60 >60 ml/min/1.73m2 GFR est non-AA >60 >60 ml/min/1.73m2 Calcium 8.5 8.5 - 10.1 MG/DL Bilirubin, total 0.3 0.2 - 1.0 MG/DL  
 ALT (SGPT) 24 12 - 78 U/L  
 AST (SGOT) 17 15 - 37 U/L Alk. phosphatase 62 45 - 117 U/L Protein, total 7.3 6.4 - 8.2 g/dL Albumin 3.5 3.5 - 5.0 g/dL Globulin 3.8 2.0 - 4.0 g/dL A-G Ratio 0.9 (L) 1.1 - 2.2 CK W/ REFLX CKMB Collection Time: 10/29/18 12:10 AM  
Result Value Ref Range  26 - 192 U/L  
TROPONIN I Collection Time: 10/29/18 12:10 AM  
Result Value Ref Range Troponin-I, Qt. <0.05 <0.05 ng/mL Radiologic Studies -  
XR CHEST PA LAT Final Result CT Results  (Last 48 hours) None CXR Results  (Last 48 hours) 10/29/18 0143  XR CHEST PA LAT Final result Impression:  IMPRESSION:  
NORMAL CHEST. Narrative:  History: Cough and chest pain. Frontal and lateral views of the chest show clear lungs. The heart, mediastinum  
and pulmonary vasculature are normal.  The bony thorax is unremarkable. Medical Decision Making I am the first provider for this patient. I reviewed the vital signs, available nursing notes, past medical history, past surgical history, family history and social history. Vital Signs-Reviewed the patient's vital signs. Patient Vitals for the past 12 hrs: 
 Temp Pulse Resp BP SpO2  
10/29/18 0330  80 17 110/40 97 % 10/29/18 0300  89 17 93/56 98 % 10/29/18 0230  77 11 116/64 99 % 10/28/18 2353 98.1 °F (36.7 °C) 87 14 140/71 98 % Pulse Oximetry Analysis - 98% on RA Cardiac Monitor:  
Rate: 87bpm 
Rhythm: Normal Sinus Rhythm Records Reviewed: Nursing Notes, Old Medical Records, Previous Radiology Studies and Previous Laboratory Studies Provider Notes (Medical Decision Making): DDx: PNA, COPD exacerbation, Bronchitis, smoking addiction ED Course:  
Initial assessment performed. The patients presenting problems have been discussed, and they are in agreement with the care plan formulated and outlined with them. I have encouraged them to ask questions as they arise throughout their visit. Discussed the risks of smoking and the benefits of smoking cessation as well as the long term sequelae of smoking with the pt who verbalized her understanding. Reviewed strategies for success, including gradually decreasing the number of cigarettes smoked a day. EKG interpretation: (Preliminary) 11:57 PM 
Rhythm: normal sinus rhythm; and regular . Rate (approx.): 82; Axis: normal; FL interval: normal; QRS interval: normal ; QTc interval: normal; other findings: no ischemic changes Written by Corrinne Orleans, ED Scribe, as dictated by Lolita William MD 
 
  
 
Progress note: 
4:13 AM 
Pt noted to be ready for discharge. Pt's workup was otherwise negative. Pt requested to leave prior to printing off her discharge papers. Written by Corrinne Orleans, ED Scribe, as dictated by Lolita William MD 
 
Critical Care Time:  
 
none Disposition: 
 
Discharge Note: 
4:17 AM 
 The pt is ready for discharge. The pt's signs, symptoms, diagnosis, and discharge instructions have been discussed and pt has conveyed their understanding. The pt is to follow up as recommended or return to ER should their symptoms worsen. Plan has been discussed and pt is in agreement. PLAN: 
1. Discharge Medication List as of 10/29/2018  4:19 AM  
  
START taking these medications Details  
albuterol (PROVENTIL HFA, VENTOLIN HFA, PROAIR HFA) 90 mcg/actuation inhaler Take 2 Puffs by inhalation every four (4) hours as needed for Wheezing., Print, Disp-1 Inhaler, R-0  
  
predniSONE (STERAPRED DS) 10 mg dose pack As directed, Print, Disp-21 Tab, R-0  
  
benzonatate (TESSALON PERLES) 100 mg capsule Take 1 Cap by mouth three (3) times daily as needed for Cough for up to 7 days. , Print, Disp-20 Cap, R-0  
  
  
CONTINUE these medications which have NOT CHANGED Details  
azithromycin (ZITHROMAX) 250 mg tablet Take 1 Tab by mouth See Admin Instructions. , Normal, Disp-6 Tab, R-0  
  
mometasone (ELOCON) 0.1 % topical cream Apply  to affected area daily. , Print, Disp-15 g, R-2  
  
omeprazole (PRILOSEC OTC) 20 mg tablet Take 20 mg by mouth daily. , Historical Med  
  
acetaminophen (TYLENOL) 325 mg tablet Take 650 mg by mouth every four (4) hours as needed for Pain. Indications: FEVER, PAIN, Historical Med 2. Follow-up Information Follow up With Specialties Details Why Contact Info Armand Cormier MD Internal Medicine In 2 days  [address] [facility] [address] [facility] 
[phone] [facility] [place] EMERGENCY DEPT Emergency Medicine  If symptoms worsen [address] [facility] 
[phone] Return to ED if worse Diagnosis Clinical Impression: 1. Acute bronchitis, unspecified organism Attestations:  
 
This note is prepared by Philomena Rump, acting as Scribe for Lolita Aponte MD 
 
 Maximino Smyth MD : The scribe's documentation has been prepared under my direction and personally reviewed by me in its entirety. I confirm that the note above accurately reflects all work, treatment, procedures, and medical decision making performed by me. This note will not be viewable in [address]

## 2018-10-29 NOTE — ED TRIAGE NOTES
Diagnosed with bronchitis by her PCP about 1 week ago. Could not afford her Z-Pack was doing fine until now now chest pain with breathing. Productive cough associated with headache.

## 2018-10-29 NOTE — DISCHARGE INSTRUCTIONS
Bronchitis: Care Instructions  Your Care Instructions    Bronchitis is inflammation of the bronchial tubes, which carry air to the lungs. The tubes swell and produce mucus, or phlegm. The mucus and inflamed bronchial tubes make you cough. You may have trouble breathing. Most cases of bronchitis are caused by viruses like those that cause colds. Antibiotics usually do not help and they may be harmful. Bronchitis usually develops rapidly and lasts about 2 to 3 weeks in otherwise healthy people. Follow-up care is a key part of your treatment and safety. Be sure to make and go to all appointments, and call your doctor if you are having problems. It's also a good idea to know your test results and keep a list of the medicines you take. How can you care for yourself at home? · Take all medicines exactly as prescribed. Call your doctor if you think you are having a problem with your medicine. · Get some extra rest.  · Take an over-the-counter pain medicine, such as acetaminophen (Tylenol), ibuprofen (Advil, Motrin), or naproxen (Aleve) to reduce fever and relieve body aches. Read and follow all instructions on the label. · Do not take two or more pain medicines at the same time unless the doctor told you to. Many pain medicines have acetaminophen, which is Tylenol. Too much acetaminophen (Tylenol) can be harmful. · Take an over-the-counter cough medicine that contains dextromethorphan to help quiet a dry, hacking cough so that you can sleep. Avoid cough medicines that have more than one active ingredient. Read and follow all instructions on the label. · Breathe moist air from a humidifier, hot shower, or sink filled with hot water. The heat and moisture will thin mucus so you can cough it out. · Do not smoke. Smoking can make bronchitis worse. If you need help quitting, talk to your doctor about stop-smoking programs and medicines. These can increase your chances of quitting for good.   When should you call for help? Call 911 anytime you think you may need emergency care. For example, call if:    · You have severe trouble breathing.    Call your doctor now or seek immediate medical care if:    · You have new or worse trouble breathing.     · You cough up dark brown or bloody mucus (sputum).     · You have a new or higher fever.     · You have a new rash.    Watch closely for changes in your health, and be sure to contact your doctor if:    · You cough more deeply or more often, especially if you notice more mucus or a change in the color of your mucus.     · You are not getting better as expected. Where can you learn more? Go to http://elissa-devorah.info/. Enter H333 in the search box to learn more about \"Bronchitis: Care Instructions. \"  Current as of: December 6, 2017  Content Version: 11.8  © 0276-1029 [employer]. Care instructions adapted under license by [employer] (which disclaims liability or warranty for this information). If you have questions about a medical condition or this instruction, always ask your healthcare professional. [address] any warranty or liability for your use of this information.

## 2018-11-19 ENCOUNTER — APPOINTMENT (OUTPATIENT)
Dept: CT IMAGING | Age: 43
DRG: 872 | End: 2018-11-19
Attending: EMERGENCY MEDICINE
Payer: SELF-PAY

## 2018-11-19 ENCOUNTER — HOSPITAL ENCOUNTER (INPATIENT)
Age: 43
LOS: 8 days | Discharge: HOME | DRG: 872 | End: 2018-11-28
Attending: EMERGENCY MEDICINE | Admitting: FAMILY MEDICINE
Payer: SELF-PAY

## 2018-11-19 DIAGNOSIS — A41.9 SEPSIS, DUE TO UNSPECIFIED ORGANISM: Primary | ICD-10-CM

## 2018-11-19 DIAGNOSIS — L03.314 CELLULITIS OF GROIN: ICD-10-CM

## 2018-11-19 DIAGNOSIS — L03.119 CELLULITIS OF LOWER EXTREMITY, UNSPECIFIED LATERALITY: ICD-10-CM

## 2018-11-19 LAB
ALBUMIN SERPL-MCNC: 3.5 G/DL (ref 3.5–5)
ALBUMIN/GLOB SERPL: 0.8 {RATIO} (ref 1.1–2.2)
ALP SERPL-CCNC: 74 U/L (ref 45–117)
ALT SERPL-CCNC: 23 U/L (ref 12–78)
ANION GAP SERPL CALC-SCNC: 12 MMOL/L (ref 5–15)
AST SERPL-CCNC: 17 U/L (ref 15–37)
BASOPHILS # BLD: 0 K/UL (ref 0–0.1)
BASOPHILS NFR BLD: 0 % (ref 0–1)
BILIRUB SERPL-MCNC: 0.6 MG/DL (ref 0.2–1)
BUN SERPL-MCNC: 14 MG/DL (ref 6–20)
BUN/CREAT SERPL: 15 (ref 12–20)
CALCIUM SERPL-MCNC: 8.6 MG/DL (ref 8.5–10.1)
CHLORIDE SERPL-SCNC: 102 MMOL/L (ref 97–108)
CO2 SERPL-SCNC: 25 MMOL/L (ref 21–32)
CREAT SERPL-MCNC: 0.95 MG/DL (ref 0.55–1.02)
DIFFERENTIAL METHOD BLD: ABNORMAL
EOSINOPHIL # BLD: 0 K/UL (ref 0–0.4)
EOSINOPHIL NFR BLD: 0 % (ref 0–7)
ERYTHROCYTE [DISTWIDTH] IN BLOOD BY AUTOMATED COUNT: 12.1 % (ref 11.5–14.5)
GLOBULIN SER CALC-MCNC: 4.3 G/DL (ref 2–4)
GLUCOSE SERPL-MCNC: 98 MG/DL (ref 65–100)
HCT VFR BLD AUTO: 41.3 % (ref 35–47)
HGB BLD-MCNC: 13.5 G/DL (ref 11.5–16)
IMM GRANULOCYTES # BLD: 0 K/UL
IMM GRANULOCYTES NFR BLD AUTO: 0 %
LACTATE BLD-SCNC: 2.46 MMOL/L (ref 0.4–2)
LYMPHOCYTES # BLD: 0.3 K/UL (ref 0.8–3.5)
LYMPHOCYTES NFR BLD: 2 % (ref 12–49)
MCH RBC QN AUTO: 31.3 PG (ref 26–34)
MCHC RBC AUTO-ENTMCNC: 32.7 G/DL (ref 30–36.5)
MCV RBC AUTO: 95.6 FL (ref 80–99)
MONOCYTES # BLD: 0.3 K/UL (ref 0–1)
MONOCYTES NFR BLD: 2 % (ref 5–13)
NEUTS BAND NFR BLD MANUAL: 4 % (ref 0–6)
NEUTS SEG # BLD: 12 K/UL (ref 1.8–8)
NEUTS SEG NFR BLD: 92 % (ref 32–75)
NRBC # BLD: 0 K/UL (ref 0–0.01)
NRBC BLD-RTO: 0 PER 100 WBC
PLATELET # BLD AUTO: 221 K/UL (ref 150–400)
PMV BLD AUTO: 11.2 FL (ref 8.9–12.9)
POTASSIUM SERPL-SCNC: 4 MMOL/L (ref 3.5–5.1)
PROT SERPL-MCNC: 7.8 G/DL (ref 6.4–8.2)
RBC # BLD AUTO: 4.32 M/UL (ref 3.8–5.2)
RBC MORPH BLD: ABNORMAL
SODIUM SERPL-SCNC: 139 MMOL/L (ref 136–145)
WBC # BLD AUTO: 12.6 K/UL (ref 3.6–11)

## 2018-11-19 PROCEDURE — 85025 COMPLETE CBC W/AUTO DIFF WBC: CPT

## 2018-11-19 PROCEDURE — 74011250636 HC RX REV CODE- 250/636: Performed by: EMERGENCY MEDICINE

## 2018-11-19 PROCEDURE — 87040 BLOOD CULTURE FOR BACTERIA: CPT

## 2018-11-19 PROCEDURE — 74011250637 HC RX REV CODE- 250/637: Performed by: EMERGENCY MEDICINE

## 2018-11-19 PROCEDURE — 96375 TX/PRO/DX INJ NEW DRUG ADDON: CPT

## 2018-11-19 PROCEDURE — 83605 ASSAY OF LACTIC ACID: CPT

## 2018-11-19 PROCEDURE — 99285 EMERGENCY DEPT VISIT HI MDM: CPT

## 2018-11-19 PROCEDURE — 80053 COMPREHEN METABOLIC PANEL: CPT

## 2018-11-19 PROCEDURE — 96365 THER/PROPH/DIAG IV INF INIT: CPT

## 2018-11-19 PROCEDURE — 36416 COLLJ CAPILLARY BLOOD SPEC: CPT

## 2018-11-19 RX ORDER — LEVOFLOXACIN 5 MG/ML
750 INJECTION, SOLUTION INTRAVENOUS EVERY 24 HOURS
Status: DISCONTINUED | OUTPATIENT
Start: 2018-11-19 | End: 2018-11-22

## 2018-11-19 RX ORDER — ACETAMINOPHEN 500 MG
1000 TABLET ORAL
Status: COMPLETED | OUTPATIENT
Start: 2018-11-19 | End: 2018-11-19

## 2018-11-19 RX ORDER — SODIUM CHLORIDE 0.9 % (FLUSH) 0.9 %
10 SYRINGE (ML) INJECTION
Status: COMPLETED | OUTPATIENT
Start: 2018-11-19 | End: 2018-11-20

## 2018-11-19 RX ORDER — MORPHINE SULFATE 2 MG/ML
4 INJECTION, SOLUTION INTRAMUSCULAR; INTRAVENOUS ONCE
Status: COMPLETED | OUTPATIENT
Start: 2018-11-19 | End: 2018-11-19

## 2018-11-19 RX ADMIN — ACETAMINOPHEN 1000 MG: 500 TABLET ORAL at 23:01

## 2018-11-19 RX ADMIN — SODIUM CHLORIDE 500 ML: 900 INJECTION, SOLUTION INTRAVENOUS at 22:36

## 2018-11-19 RX ADMIN — MORPHINE SULFATE 4 MG: 2 INJECTION, SOLUTION INTRAMUSCULAR; INTRAVENOUS at 23:02

## 2018-11-19 RX ADMIN — LEVOFLOXACIN 750 MG: 5 INJECTION, SOLUTION INTRAVENOUS at 22:37

## 2018-11-20 ENCOUNTER — APPOINTMENT (OUTPATIENT)
Dept: CT IMAGING | Age: 43
DRG: 872 | End: 2018-11-20
Attending: EMERGENCY MEDICINE
Payer: SELF-PAY

## 2018-11-20 PROBLEM — D72.829 LEUKOCYTOSIS: Status: ACTIVE | Noted: 2018-11-20

## 2018-11-20 PROBLEM — R65.10 SIRS (SYSTEMIC INFLAMMATORY RESPONSE SYNDROME) (HCC): Status: ACTIVE | Noted: 2018-11-20

## 2018-11-20 PROBLEM — R00.0 TACHYCARDIA: Status: ACTIVE | Noted: 2018-11-20

## 2018-11-20 PROBLEM — L03.90 CELLULITIS: Status: ACTIVE | Noted: 2018-11-20

## 2018-11-20 LAB
ANION GAP SERPL CALC-SCNC: 11 MMOL/L (ref 5–15)
APPEARANCE UR: ABNORMAL
BACTERIA URNS QL MICRO: NEGATIVE /HPF
BILIRUB UR QL: NEGATIVE
BUN SERPL-MCNC: 14 MG/DL (ref 6–20)
BUN/CREAT SERPL: 19 (ref 12–20)
CALCIUM SERPL-MCNC: 7.4 MG/DL (ref 8.5–10.1)
CHLORIDE SERPL-SCNC: 106 MMOL/L (ref 97–108)
CO2 SERPL-SCNC: 20 MMOL/L (ref 21–32)
COLOR UR: ABNORMAL
CREAT SERPL-MCNC: 0.74 MG/DL (ref 0.55–1.02)
EPITH CASTS URNS QL MICRO: ABNORMAL /LPF
GLUCOSE SERPL-MCNC: 100 MG/DL (ref 65–100)
GLUCOSE UR STRIP.AUTO-MCNC: NEGATIVE MG/DL
HCG UR QL: NEGATIVE
HGB UR QL STRIP: ABNORMAL
HYALINE CASTS URNS QL MICRO: ABNORMAL /LPF (ref 0–5)
INR PPP: 1.3 (ref 0.9–1.1)
KETONES UR QL STRIP.AUTO: NEGATIVE MG/DL
LACTATE SERPL-SCNC: 1.2 MMOL/L (ref 0.4–2)
LEUKOCYTE ESTERASE UR QL STRIP.AUTO: ABNORMAL
NITRITE UR QL STRIP.AUTO: NEGATIVE
PH UR STRIP: 5.5 [PH] (ref 5–8)
POTASSIUM SERPL-SCNC: 3.5 MMOL/L (ref 3.5–5.1)
PROT UR STRIP-MCNC: NEGATIVE MG/DL
PROTHROMBIN TIME: 13 SEC (ref 9–11.1)
RBC #/AREA URNS HPF: >100 /HPF (ref 0–5)
SODIUM SERPL-SCNC: 137 MMOL/L (ref 136–145)
SP GR UR REFRACTOMETRY: 1.02 (ref 1–1.03)
UROBILINOGEN UR QL STRIP.AUTO: 1 EU/DL (ref 0.2–1)
WBC URNS QL MICRO: ABNORMAL /HPF (ref 0–4)

## 2018-11-20 PROCEDURE — 36415 COLL VENOUS BLD VENIPUNCTURE: CPT

## 2018-11-20 PROCEDURE — 74011000258 HC RX REV CODE- 258: Performed by: EMERGENCY MEDICINE

## 2018-11-20 PROCEDURE — 74011636320 HC RX REV CODE- 636/320: Performed by: RADIOLOGY

## 2018-11-20 PROCEDURE — 85610 PROTHROMBIN TIME: CPT

## 2018-11-20 PROCEDURE — 74011000258 HC RX REV CODE- 258: Performed by: RADIOLOGY

## 2018-11-20 PROCEDURE — 74011250637 HC RX REV CODE- 250/637: Performed by: FAMILY MEDICINE

## 2018-11-20 PROCEDURE — 81001 URINALYSIS AUTO W/SCOPE: CPT

## 2018-11-20 PROCEDURE — 93970 EXTREMITY STUDY: CPT

## 2018-11-20 PROCEDURE — 74177 CT ABD & PELVIS W/CONTRAST: CPT

## 2018-11-20 PROCEDURE — 74011250636 HC RX REV CODE- 250/636: Performed by: EMERGENCY MEDICINE

## 2018-11-20 PROCEDURE — 83605 ASSAY OF LACTIC ACID: CPT

## 2018-11-20 PROCEDURE — 96367 TX/PROPH/DG ADDL SEQ IV INF: CPT

## 2018-11-20 PROCEDURE — 81025 URINE PREGNANCY TEST: CPT

## 2018-11-20 PROCEDURE — 74011250636 HC RX REV CODE- 250/636: Performed by: FAMILY MEDICINE

## 2018-11-20 PROCEDURE — 80048 BASIC METABOLIC PNL TOTAL CA: CPT

## 2018-11-20 PROCEDURE — 65660000000 HC RM CCU STEPDOWN

## 2018-11-20 RX ORDER — VANCOMYCIN 1.75 GRAM/500 ML IN 0.9 % SODIUM CHLORIDE INTRAVENOUS
1750 EVERY 12 HOURS
Status: DISCONTINUED | OUTPATIENT
Start: 2018-11-20 | End: 2018-11-22

## 2018-11-20 RX ORDER — ONDANSETRON 2 MG/ML
8 INJECTION INTRAMUSCULAR; INTRAVENOUS
Status: COMPLETED | OUTPATIENT
Start: 2018-11-20 | End: 2018-11-20

## 2018-11-20 RX ORDER — IBUPROFEN 200 MG
1 TABLET ORAL DAILY
Status: DISCONTINUED | OUTPATIENT
Start: 2018-11-20 | End: 2018-11-28 | Stop reason: HOSPADM

## 2018-11-20 RX ORDER — VANCOMYCIN 2 GRAM/500 ML IN 0.9 % SODIUM CHLORIDE INTRAVENOUS
2 ONCE
Status: COMPLETED | OUTPATIENT
Start: 2018-11-20 | End: 2018-11-20

## 2018-11-20 RX ORDER — SODIUM CHLORIDE 9 MG/ML
125 INJECTION, SOLUTION INTRAVENOUS CONTINUOUS
Status: DISCONTINUED | OUTPATIENT
Start: 2018-11-20 | End: 2018-11-23

## 2018-11-20 RX ORDER — ACETAMINOPHEN 325 MG/1
650 TABLET ORAL
Status: COMPLETED | OUTPATIENT
Start: 2018-11-20 | End: 2018-11-20

## 2018-11-20 RX ORDER — ENOXAPARIN SODIUM 100 MG/ML
40 INJECTION SUBCUTANEOUS EVERY 24 HOURS
Status: DISCONTINUED | OUTPATIENT
Start: 2018-11-20 | End: 2018-11-28 | Stop reason: HOSPADM

## 2018-11-20 RX ORDER — SODIUM CHLORIDE 0.9 % (FLUSH) 0.9 %
5-10 SYRINGE (ML) INJECTION EVERY 8 HOURS
Status: DISCONTINUED | OUTPATIENT
Start: 2018-11-20 | End: 2018-11-28 | Stop reason: HOSPADM

## 2018-11-20 RX ORDER — ACETAMINOPHEN 325 MG/1
650 TABLET ORAL
Status: DISCONTINUED | OUTPATIENT
Start: 2018-11-20 | End: 2018-11-26

## 2018-11-20 RX ORDER — PANTOPRAZOLE SODIUM 40 MG/1
40 TABLET, DELAYED RELEASE ORAL DAILY
Status: DISCONTINUED | OUTPATIENT
Start: 2018-11-20 | End: 2018-11-28 | Stop reason: HOSPADM

## 2018-11-20 RX ORDER — SODIUM CHLORIDE 0.9 % (FLUSH) 0.9 %
5-10 SYRINGE (ML) INJECTION AS NEEDED
Status: DISCONTINUED | OUTPATIENT
Start: 2018-11-20 | End: 2018-11-28 | Stop reason: HOSPADM

## 2018-11-20 RX ORDER — VANCOMYCIN 2 GRAM/500 ML IN 0.9 % SODIUM CHLORIDE INTRAVENOUS
2000
Status: DISCONTINUED | OUTPATIENT
Start: 2018-11-20 | End: 2018-11-20

## 2018-11-20 RX ADMIN — PANTOPRAZOLE SODIUM 40 MG: 40 TABLET, DELAYED RELEASE ORAL at 10:39

## 2018-11-20 RX ADMIN — LEVOFLOXACIN 750 MG: 5 INJECTION, SOLUTION INTRAVENOUS at 21:36

## 2018-11-20 RX ADMIN — Medication 10 ML: at 14:32

## 2018-11-20 RX ADMIN — AZTREONAM 2 G: 2 INJECTION, POWDER, LYOPHILIZED, FOR SOLUTION INTRAMUSCULAR; INTRAVENOUS at 17:36

## 2018-11-20 RX ADMIN — ACETAMINOPHEN 650 MG: 325 TABLET ORAL at 17:43

## 2018-11-20 RX ADMIN — ONDANSETRON 8 MG: 2 INJECTION INTRAMUSCULAR; INTRAVENOUS at 02:18

## 2018-11-20 RX ADMIN — VANCOMYCIN HYDROCHLORIDE 2000 MG: 10 INJECTION, POWDER, LYOPHILIZED, FOR SOLUTION INTRAVENOUS at 03:30

## 2018-11-20 RX ADMIN — AZTREONAM 2 G: 2 INJECTION, POWDER, LYOPHILIZED, FOR SOLUTION INTRAMUSCULAR; INTRAVENOUS at 02:18

## 2018-11-20 RX ADMIN — SODIUM CHLORIDE 125 ML/HR: 900 INJECTION, SOLUTION INTRAVENOUS at 06:51

## 2018-11-20 RX ADMIN — SODIUM CHLORIDE 125 ML/HR: 900 INJECTION, SOLUTION INTRAVENOUS at 14:41

## 2018-11-20 RX ADMIN — ENOXAPARIN SODIUM 40 MG: 40 INJECTION SUBCUTANEOUS at 10:43

## 2018-11-20 RX ADMIN — Medication 10 ML: at 21:36

## 2018-11-20 RX ADMIN — SODIUM CHLORIDE 100 ML: 900 INJECTION, SOLUTION INTRAVENOUS at 01:20

## 2018-11-20 RX ADMIN — Medication 10 ML: at 01:20

## 2018-11-20 RX ADMIN — Medication 10 ML: at 10:39

## 2018-11-20 RX ADMIN — VANCOMYCIN HYDROCHLORIDE 1750 MG: 10 INJECTION, POWDER, LYOPHILIZED, FOR SOLUTION INTRAVENOUS at 14:31

## 2018-11-20 RX ADMIN — ACETAMINOPHEN 650 MG: 325 TABLET ORAL at 07:07

## 2018-11-20 RX ADMIN — IOPAMIDOL 100 ML: 755 INJECTION, SOLUTION INTRAVENOUS at 01:20

## 2018-11-20 RX ADMIN — SODIUM CHLORIDE 1000 ML: 900 INJECTION, SOLUTION INTRAVENOUS at 02:22

## 2018-11-20 RX ADMIN — AZTREONAM 2 G: 2 INJECTION, POWDER, LYOPHILIZED, FOR SOLUTION INTRAMUSCULAR; INTRAVENOUS at 10:44

## 2018-11-20 NOTE — H&P
[address]  HISTORY AND PHYSICAL Dano Mcdonald 
MR#: [id number] : 1975 ACCOUNT #: [de-identified] ADMIT DATE: 2018 CHIEF COMPLAINT:  Back pain, thigh and leg pain. HISTORY OF PRESENT ILLNESS:  A 82-year-old white female with a past medical history of chronic back pain, depression, GERD, morbid obesity, hemorrhoids, irregular heartbeat, thromboembolus, unspecified, presented to the Emergency Department from home with a chief complaint of leg and thigh pain. Per the initial report, patient had reported left inner thigh pain, but now she more is concentrated more on pain in her left leg, which she notes is new onset, starting yesterday evening. The pain was reported to be a 10/10, aggravated with any touch, associated with redness and swelling. She also complained of having fever, nausea, increased urinary frequency, decreased urine output, nonproductive cough. On arrival to the Emergency Department, the patient was febrile with a temperature of 103.1 degrees Fahrenheit, blood pressure 136/55, heart rate of 121, respiratory rate of 18, with saturation of 100% on room air. Labs showed an elevated WBC of 12,600, neutrophils of 92%. CT abdomen and pelvis with IV contrast showed mildly enlarged left inguinal lymph nodes with surrounding inflammatory changes, likely reactive, but no acute other abdominopelvic findings noted. Per ED MD provider's notes, the patient had evidence of erythema, warmth, and a foul odor on perineum and inner thigh region. Patient did not report having any recent vaginal discharge. She notes that she started her last normal menstrual period a few days ago. She notes she is currently on her cycle.   Otherwise, no complaints of dizziness, lightheadedness, focal weakness, numbness, paresthesias, slurred speech, facial droop, headache, neck pain, new onset back pain, chest pain, shortness of breath, cough, congestion, palpitations, abdominal pain, vomiting. She notes she had bowel incontinence on the way to the hospital.  On current evaluation, after having reported inner thigh pain, she now denies the same. PAST MEDICAL HISTORY:   
1.  Back pain. 2.  Cough. 3.  Depression. 4.  GERD. 5.  Hemorrhoids. 6.  Irregular heartbeat. 7.  Thromboembolus, DVT. 8.  Morbid obesity. PAST SURGICAL HISTORY:  None reported. MEDICATIONS:  Complete medication list reviewed and noted on the chart records. Taking Last Dose Start Date End Date Provider   
 acetaminophen (TYLENOL) 325 mg tablet    --  --  Provider, Historical  
 Take 650 mg by mouth every four (4) hours as needed for Pain. Indications: FEVER, PAIN  
 albuterol (PROVENTIL HFA, VENTOLIN HFA, PROAIR HFA) 90 mcg/actuation inhaler    10/29/18  --  Lolita William MD  
 Take 2 Puffs by inhalation every four (4) hours as needed for Wheezing.  
 mometasone (ELOCON) 0.1 % topical cream    08/24/18  -- Wade Winter MD  
 Apply  to affected area daily. omeprazole (PRILOSEC OTC) 20 mg tablet    --  --  Provider, Historical  
 Take 20 mg by mouth daily. predniSONE (STERAPRED DS) 10 mg dose pack    10/29/18  --  Lolita William MD  
 As directed ALLERGIES:  PENICILLIN, BACTRIM, DOXYCYCLINE, AND NAPROXEN. SOCIAL HISTORY:  Positive for smoking cigarettes, 1 pack per day. Positive for occasional alcohol. Denies illicit drugs. . FAMILY HISTORY:  Heart disease in mother, COPD in mother, cancer, unspecified, in maternal grandmother, diabetes in maternal grandmother. REVIEW OF SYSTEMS:  Pertinent positives are as noted in HPI. All other systems were reviewed and negative. PHYSICAL EXAMINATION: 
VITAL SIGNS:  Temperature maximum 103.1 degrees Fahrenheit, blood pressure 102/53, heart rate 107, respiratory rate of 26, O2 saturation 98% on room air. Recorded weight of 350 pounds (158.8 kg), recorded height of 5 feet 2 inches tall, BMI 64.0. GENERAL:  Morbidly obese female in no acute respiratory distress. PSYCHIATRIC:  Patient is awake, alert, and oriented x3. Anxious. NEUROLOGIC:  GCS of 15. Moves extremities x4. Sensation grossly intact without slurred speech or facial droop. HEENT:  Normocephalic, atraumatic. PERRLA. EOMs intact. Sclerae are anicteric. Conjunctivae are clear. Nares are patent. Oropharynx is clear. Tongue is midline, not edematous. NECK:  Supple without lymphadenopathy, JVD, carotid bruit, or thyromegaly. LYMPH:  Negative for cervical or supraclavicular lymphadenopathy. RESPIRATORY:  Lungs are clear to auscultation bilaterally. CARDIOVASCULAR:  Heart:  Tachycardic, regular rhythm. No murmurs, rubs, or gallops. GASTROINTESTINAL:  Abdomen is soft, obese, nontender, nondistended. Normoactive bowel sounds. No rebound, guarding, or rigidity. No auscultated abdominal bruits. No palpable abdominal mass. BACK:  No CVA tenderness. No step-offs. MUSCULOSKELETAL:  Tenderness on palpation of the left leg with noted erythema, warmth, edema. The entire examination was performed with a female nurse chaperone present. There is evidence of tenderness on palpation of bilateral proximal and medial thighs with erythema which extends from bilateral inguinal/perineal regions towards the buttocks. Foul odor was also noted coming from these erythematous sites. Otherwise, no tenderness on palpation of the right calf. No acute palpable bony deformity. VASCULAR:  2+ radial and 1+ dorsalis pedis pulses without cyanosis or clubbing. There is noted edema of the lower extremities. SKIN:  Warm and dry with exam noting aforementioned erythema involving the left lower extremity, bilateral proximal thighs, perineal and buttock regions. LABORATORY DATA:  Reviewed.   Sodium 139, potassium 4.0, chloride 102, CO2 of 25, BUN 14, creatinine 0.95, glucose 98, anion gap of 12, calcium of 8.6, GFR greater than 60, total bilirubin 0.6, total protein 7.8, albumin 3.5, ALT of 23, AST of 17, alkaline phosphatase of 74. WBC 12.6, hemoglobin of 13.5, hematocrit 41.3, platelets of 642, neutrophils are 92%, bands of 4%. Urinalysis:  Leukocyte esterase trace, nitrite negative, urobilinogen is 1.0, bilirubin negative, blood large, ketones negative, glucose negative, protein negative, pH of 5.5, specific gravity 1.024, WBCs 0-4, RBCs greater than 100, bacteria negative. CT abdomen and pelvis with IV contrast results are reviewed and noted. IMPRESSION AND PLAN: 
1. Systemic inflammatory response syndrome, now likely sepsis with evidence of cellulitis on exam.  The patient had already been started on aztreonam 2 grams IV q.8 hours and levofloxacin 750 mg IV q.24 hours. Will also add vancomycin, Pharmacy to dose. Will adjust antibiotics accordingly. Check blood and urine cultures. 2.  Cellulitis -- involving bilateral thighs, perineal, and left lower extremity. Plan is as noted above. 3.  Fever. Plan is as noted. Add antipyretics if needed. 4.  Leukocytosis. Repeat CBC. 5.  Tachycardia. Continue telemetry monitoring. 6.  Tobacco abuse. Encouraged smoking cessation. Will order a nicotine patch. 7.  Morbid obesity. I have recommended weight loss, a heart healthy diet, and lifestyle modification. 8.  Left inguinal lymphadenopathy. Consider for General Surgery evaluation of the same. 9.  Gastroesophageal reflux disease. Continue omeprazole. 10.  Venous thromboembolism prophylaxis. Sequential compression devices on bilateral lower extremities. MD MITCHELL Naqvi/DEMOND 
D: 11/20/2018 06:57    
T: 11/20/2018 08:54 JOB #: [id number]

## 2018-11-20 NOTE — ROUTINE PROCESS
TRANSFER - OUT REPORT: 
 
Verbal report given to MARGY Quinteros(name) on Ryder Conde  being transferred to [facility](unit) for routine progression of care Report consisted of patients Situation, Background, Assessment and  
Recommendations(SBAR). Information from the following report(s) SBAR, ED Summary, Procedure Summary, Intake/Output and MAR was reviewed with the receiving nurse. Lines:  
Peripheral IV 11/19/18 Right Wrist (Active) Site Assessment Clean, dry, & intact 11/19/2018 10:29 PM  
Phlebitis Assessment 0 11/19/2018 10:29 PM  
Infiltration Assessment 0 11/19/2018 10:29 PM  
Dressing Status Clean, dry, & intact 11/19/2018 10:29 PM  
Dressing Type Transparent 11/19/2018 10:29 PM  
Hub Color/Line Status Pink 11/19/2018 10:29 PM  
Action Taken Blood drawn 11/19/2018 10:29 PM  
  
 
Opportunity for questions and clarification was provided. Patient transported with: 
 transporter

## 2018-11-20 NOTE — ED TRIAGE NOTES
Pt presents with left leg pain, 10/10 after waking up from a nap around 6pm. Pt reports a cough and presents with a fever.

## 2018-11-20 NOTE — PROGRESS NOTES
53-year-old white female with a past medical history of chronic back pain, depression, GERD, morbid obesity, hemorrhoids, irregular heartbeat, thromboembolus, unspecified, presented to the Emergency Department from home with a chief complaint of leg and thigh pain. Found to have cellulitis. Seen and examined at the bedside. Discussed with nursing. Plan as per H&P. Kareem Arndt MD

## 2018-11-20 NOTE — PROGRESS NOTES
Problem: Falls - Risk of 
Goal: *Absence of Falls Document Rosalind Pickering Fall Risk and appropriate interventions in the flowsheet. Outcome: Progressing Towards Goal 
Fall Risk Interventions: 
  
 
  
 
  
 
Elimination Interventions: Bed/chair exit alarm, Patient to call for help with toileting needs, Toilet paper/wipes in reach, Toileting schedule/hourly rounds Problem: Pressure Injury - Risk of 
Goal: *Prevention of pressure injury Document Garret Scale and appropriate interventions in the flowsheet. Outcome: Progressing Towards Goal 
Pressure Injury Interventions: 
  
 
Moisture Interventions: Absorbent underpads Activity Interventions: PT/OT evaluation Mobility Interventions: PT/OT evaluation

## 2018-11-20 NOTE — CDMP QUERY
Please clarify if this patient is (was) being treated/managed for:  
 
=> Severe sepsis (POA) as evidenced of pt with Sepsis and acute cardiovascular dysfunction noted with MAP < 60s, requiring Sepsis protocol, including IVF, IV broad spectrum abx, (Levofloxacin, IV vanco, ), lactic acid. close monitoring of VS. 
 
 
=> Other explanation of clinical findings 
=> Clinically Undetermined (no explanation for clinical findings) The medical record reflects the following clinical findings, treatment, and risk factors. Risk Factors:  Cellulitis of leg, morbid obesity Clinical Indicators:  Temp @ 103.1, HR @ 107, resp @ 26, and BP MAP < 60--- Treatment:  Severe Sepsis initiated in ER, requiring Sepsis protocol, including IVF, IV broad spectrum abx, (Levofloxacin, IV vanco, ), lactic acid. close monitoring of VS. Please clarify and document your clinical opinion in the progress notes and discharge summary including the definitive and/or presumptive diagnosis, (suspected or probable), related to the above clinical findings. Please include clinical findings supporting your diagnosis. Thank you, Errol Mitchell ,SOPHIEN, RN, [facility], [facility] 
CDMP

## 2018-11-20 NOTE — WOUND CARE
WOCN Note:  
 
New consult placed for assessment of left low leg. Chart reviewed. Admitted DX:  Left low leg cellulitis. Patient reports that she was scratching an area on her low leg that has psoriasis. Past Medical History:  chronic back pain, depression, GERD, morbid obesity, hemorrhoids, irregular heartbeat, thromboembolus. Assessment:  
Patient is A&O x 3, communicative, continent and requires assist with repositioning. Will order turn team for assistance with q2 turns. Bed: [facility]. Will upgrade bed to bariatric. Patient has a pure wick. Patient reports pain to left low leg. Heels intact without erythema and offloaded on pillow. Edema and warm erythema to left lower leg. Dry patch of skin to the anterior left ankle consistent with psoriasis. Skin Care & Pressure Prevention: 
Minimize layers of linen/pads under patient to optimize support surface. Turn/reposition approximately every 2 hours and offload heels. Specialty bed: Bariatric ordered via [facility]. Use only flat sheet and one incontinence pad. Please call Alfredo Reddy if not delivered. REF# [id number]. Discussed above plan with patient & RN. Transition of Care: Plan to follow weekly and as needed while admitted to hospital. 
 
SOPHIE MartinezN RN [facility], [facility]. Wound Care Office [phone] Pager [phone]

## 2018-11-20 NOTE — ED PROVIDER NOTES
41 y.o. female with past medical history significant for Thromboembolus, GERD, Depression, Hemorrhoid, and Irregular Heart Beat who presents from home with chief complaint of thigh pain. Patient states onset today at 1800 of inner left thigh pain rated as 10/10 pain. Patient presents to [facility] ED tonight with persisting inner left thigh pain. Upon examination the inner left thigh area was moist and foul smelling. Patient reports aggravation of inner left thigh pain with ambulation and positional movement of her legs. Patient reports accompanying fever (102F), nausea, increased urinary frequency, decreased urine output, and nonproductive cough. Patient reports history of psoriasis, and notes recently having a spot on her inner left thigh which she had \"her  itch. \" Patient reports history of DVT, and family history of diabetes. Patient endorses the use of tobacco described as smoking a pack of cigarettes a day. Pt denies chills, congestion, shortness of breath, chest pain, abdominal pain, vomiting, diarrhea, difficulty with urination or dysuria. There are no other acute medical concerns at this time. PCP: Eddie Parr MD 
 
Note written by Adelina Goins, as dictated by Stanley Urbano MD 10:47 PM. The history is provided by the patient. Past Medical History:  
Diagnosis Date  Back pain  Cough  Depression  GERD (gastroesophageal reflux disease)  Hemorrhoid  Irregular heart beat  SOB (shortness of breath)  Stress  Thromboembolus ([address]  Tiredness  Wheezing History reviewed. No pertinent surgical history. Family History:  
Problem Relation Age of Onset  Mental Retardation Mother  Heart Disease Mother  COPD Mother  Cancer Maternal Grandmother  Diabetes Maternal Grandmother Social History Socioeconomic History  Marital status:  Spouse name: Not on file  Number of children: Not on file  Years of education: Not on file  Highest education level: Not on file Social Needs  Financial resource strain: Not on file  Food insecurity - worry: Not on file  Food insecurity - inability: Not on file  Transportation needs - medical: Not on file  Transportation needs - non-medical: Not on file Occupational History  Not on file Tobacco Use  Smoking status: Current Every Day Smoker Packs/day: 1.50  Smokeless tobacco: Current User Substance and Sexual Activity  Alcohol use: No  
 Drug use: No  
 Sexual activity: Yes  
  Partners: Male Other Topics Concern  Not on file Social History Narrative  Not on file ALLERGIES: Pcn [penicillins]; Bactrim [sulfamethoprim]; Doxycycline; and Naprosyn [naproxen] Review of Systems Constitutional: Negative for chills and fever. HENT: Negative for congestion. Respiratory: Positive for cough. Negative for shortness of breath. Cardiovascular: Negative for chest pain. Gastrointestinal: Positive for nausea. Negative for abdominal pain, constipation, diarrhea and vomiting. Genitourinary: Positive for decreased urine volume and frequency. Negative for difficulty urinating and dysuria. Neurological: Negative for dizziness and light-headedness. All other systems reviewed and are negative. Vitals:  
 11/19/18 2201 11/19/18 2213 11/19/18 2315 11/19/18 2345 BP: 136/55  126/72 127/45 Pulse: (!) 111  (!) 121 (!) 119 Resp: 18  24 18 Temp: (!) 103.1 °F (39.5 °C) SpO2: 100% 99% 97% 95% Weight: 158.8 kg (350 lb) Height: 5' 2\" (1.575 m) Physical Exam  
Constitutional: She is oriented to person, place, and time. She appears well-developed. No distress. Exam limited due to body habitus and limited flexibiltiy HENT:  
Head: Normocephalic and atraumatic. Eyes: Pupils are equal, round, and reactive to light. No scleral icterus. Neck: Normal range of motion. Neck supple. Cardiovascular: Regular rhythm. Tachycardia present. Pulmonary/Chest: Effort normal and breath sounds normal.  
Abdominal: Soft. She exhibits no distension. There is no tenderness. There is no rebound and no guarding. Genitourinary:  
Genitourinary Comments: Perineum and inner thighs erythema and warmth with unpleasant odor Musculoskeletal: Normal range of motion. Neurological: She is alert and oriented to person, place, and time. Skin: Skin is warm and dry. She is not diaphoretic. Psychiatric: She has a normal mood and affect. Her behavior is normal. Thought content normal.  
Nursing note and vitals reviewed. Note written by Adelina Almonte, as dictated by Justen Hudson MD 10:47 PM 
  
 
MDM Number of Diagnoses or Management Options Cellulitis of groin:  
Sepsis, due to unspecified organism [facility]):  
Diagnosis management comments: Pt presents with sepsis found to have cellulitis. Received abx and IV fluids with improvement in vital signs. Admitted to the hospital for additional management. DDX: 
PNA, pericarditis, electrolyte abnormality, dehydration, influenza, UTI, pyelonephritis, gastroenteritis, diverticulitis, cholecystitis Procedures PROGRESS NOTE: 
12:00 AM Not giving patient 4L IV fluids because she is morbidly obese. Patient is being admitted to the hospital.  The results of their tests and reasons for their admission have been discussed with them and/or available family. They convey agreement and understanding for the need to be admitted and for their admission diagnosis. Pt signed out to Dr. Andrea Samuel for additional management. Reviewed presentation, results, and pending CT.

## 2018-11-20 NOTE — ED NOTES
1:03 AM 
Change of shift. Care of patient taken over from Dr. Jacolyn Spatz; H&P reviewed, bedside handoff complete. Awaiting CT result. PROGRESS NOTE: 
2:00 AM  
Abdominal CT has resulted and shows no surgical issue. Will admit to hospitalist.  
 
CONSULT NOTE: 
2:04 Dago Pike MD communicated with Dr. Marvel Davis, Consult for Hospitalist via [facility] Text. Discussed available diagnostic tests and clinical findings.   Dr. Marvel Davis will evaluate the patient for admission to the hospital.

## 2018-11-20 NOTE — PROGRESS NOTES
Pharmacist Note - Vancomycin Dosing Consult provided for this 41 y.o. female for indication of bacteremia. Antibiotic regimen(s): aztreonam, levaquin and vancomycin Recent Labs 18 
2226 WBC 12.6* CREA 0.95 BUN 14 Frequency of BMP: daily Height: 157.5 cm Weight: 158.8 kg Est CrCl: 75 ml/min Temp (24hrs), Av.6 °F (38.7 °C), Min:99.8 °F (37.7 °C), Max:103.1 °F (39.5 °C) Cultures: 
blood Goal trough = 15 - 20 mcg/mL Therapy will be initiated with a loading dose of 2000 mg IV x 1 to be followed by a maintenance dose of 1750 mg IV every 12 hours. Pharmacy to follow patient daily and order levels / make dose adjustments as appropriate.

## 2018-11-20 NOTE — PROCEDURES
[address]  *** FINAL REPORT ***    Name: Moe Zimmer  MRN: [id number]    Inpatient  : [address] 1975  HIS Order #: [id number]  [id number] Visit #: [id number]  Date: 2018    TYPE OF TEST: Peripheral Venous Testing    REASON FOR TEST  Pain in limb, Limb swelling    Right Leg:-  Deep venous thrombosis:           No  Superficial venous thrombosis:    No  Deep venous insufficiency:        Not examined  Superficial venous insufficiency: Not examined    Left Leg:-  Deep venous thrombosis:           No  Superficial venous thrombosis:    No  Deep venous insufficiency:        Not examined  Superficial venous insufficiency: Not examined      INTERPRETATION/FINDINGS  PROCEDURE:  Color duplex ultrasound imaging of lower extremity veins. FINDINGS:       Right: The common femoral, deep femoral, femoral, popliteal,  posterior tibial, peroneal, and great saphenous are patent and without   evidence of thrombus;  each is fully compressible and there is no  narrowing of the flow channel on color Doppler imaging. Phasic flow  is observed in the common femoral vein. Left:   The common femoral, deep femoral, femoral, popliteal,  posterior tibial, peroneal, and great saphenous are patent and without   evidence of thrombus;  each is fully compressible and there is no  narrowing of the flow channel on color Doppler imaging. Phasic flow  is observed in the common femoral vein. IMPRESSION:  No evidence of right or left lower extremity vein  thrombosis. There is limited visualization of the left lower extremity  calf veins. ADDITIONAL COMMENTS    I have personally reviewed the data relevant to the interpretation of  this  study. TECHNOLOGIST: Karen Jiang.  Meli Mondragon  Signed: 2018 07:56 AM    PHYSICIAN: Soha Patterson MD  Signed: 2018 08:37 AM

## 2018-11-21 ENCOUNTER — APPOINTMENT (OUTPATIENT)
Dept: GENERAL RADIOLOGY | Age: 43
DRG: 872 | End: 2018-11-21
Attending: FAMILY MEDICINE
Payer: SELF-PAY

## 2018-11-21 LAB
ANION GAP SERPL CALC-SCNC: 9 MMOL/L (ref 5–15)
BASOPHILS # BLD: 0 K/UL (ref 0–0.1)
BASOPHILS NFR BLD: 0 % (ref 0–1)
BUN SERPL-MCNC: 11 MG/DL (ref 6–20)
BUN/CREAT SERPL: 13 (ref 12–20)
CALCIUM SERPL-MCNC: 8.2 MG/DL (ref 8.5–10.1)
CHLORIDE SERPL-SCNC: 104 MMOL/L (ref 97–108)
CO2 SERPL-SCNC: 23 MMOL/L (ref 21–32)
CREAT SERPL-MCNC: 0.82 MG/DL (ref 0.55–1.02)
DIFFERENTIAL METHOD BLD: ABNORMAL
EOSINOPHIL # BLD: 0 K/UL (ref 0–0.4)
EOSINOPHIL NFR BLD: 0 % (ref 0–7)
ERYTHROCYTE [DISTWIDTH] IN BLOOD BY AUTOMATED COUNT: 12.5 % (ref 11.5–14.5)
GLUCOSE BLD STRIP.AUTO-MCNC: 125 MG/DL (ref 65–100)
GLUCOSE BLD STRIP.AUTO-MCNC: 129 MG/DL (ref 65–100)
GLUCOSE SERPL-MCNC: 107 MG/DL (ref 65–100)
HCT VFR BLD AUTO: 34.1 % (ref 35–47)
HGB BLD-MCNC: 11.1 G/DL (ref 11.5–16)
IMM GRANULOCYTES # BLD: 0 K/UL
IMM GRANULOCYTES NFR BLD AUTO: 0 %
LYMPHOCYTES # BLD: 0.3 K/UL (ref 0.8–3.5)
LYMPHOCYTES NFR BLD: 2 % (ref 12–49)
MCH RBC QN AUTO: 30.9 PG (ref 26–34)
MCHC RBC AUTO-ENTMCNC: 32.6 G/DL (ref 30–36.5)
MCV RBC AUTO: 95 FL (ref 80–99)
MONOCYTES # BLD: 0.3 K/UL (ref 0–1)
MONOCYTES NFR BLD: 2 % (ref 5–13)
NEUTS BAND NFR BLD MANUAL: 5 % (ref 0–6)
NEUTS SEG # BLD: 12.5 K/UL (ref 1.8–8)
NEUTS SEG NFR BLD: 91 % (ref 32–75)
NRBC # BLD: 0 K/UL (ref 0–0.01)
NRBC BLD-RTO: 0 PER 100 WBC
PLATELET # BLD AUTO: 175 K/UL (ref 150–400)
PLATELET COMMENTS,PCOM: ABNORMAL
PMV BLD AUTO: 11.3 FL (ref 8.9–12.9)
POTASSIUM SERPL-SCNC: 3.4 MMOL/L (ref 3.5–5.1)
RBC # BLD AUTO: 3.59 M/UL (ref 3.8–5.2)
RBC MORPH BLD: ABNORMAL
RBC MORPH BLD: ABNORMAL
SERVICE CMNT-IMP: ABNORMAL
SERVICE CMNT-IMP: ABNORMAL
SODIUM SERPL-SCNC: 136 MMOL/L (ref 136–145)
WBC # BLD AUTO: 13.1 K/UL (ref 3.6–11)

## 2018-11-21 PROCEDURE — 80048 BASIC METABOLIC PNL TOTAL CA: CPT

## 2018-11-21 PROCEDURE — 76210000063 HC OR PH I REC FIRST 0.5 HR

## 2018-11-21 PROCEDURE — 02HV33Z INSERTION OF INFUSION DEVICE INTO SUPERIOR VENA CAVA, PERCUTANEOUS APPROACH: ICD-10-PCS | Performed by: ANESTHESIOLOGY

## 2018-11-21 PROCEDURE — 85025 COMPLETE CBC W/AUTO DIFF WBC: CPT

## 2018-11-21 PROCEDURE — 65660000000 HC RM CCU STEPDOWN

## 2018-11-21 PROCEDURE — 74011250636 HC RX REV CODE- 250/636: Performed by: FAMILY MEDICINE

## 2018-11-21 PROCEDURE — 77030038269 HC DRN EXT URIN PURWCK BARD -A

## 2018-11-21 PROCEDURE — 74011250637 HC RX REV CODE- 250/637: Performed by: INTERNAL MEDICINE

## 2018-11-21 PROCEDURE — 74011250636 HC RX REV CODE- 250/636: Performed by: EMERGENCY MEDICINE

## 2018-11-21 PROCEDURE — 36415 COLL VENOUS BLD VENIPUNCTURE: CPT

## 2018-11-21 PROCEDURE — 76010000093 HC SPECIAL PROCEDURE

## 2018-11-21 PROCEDURE — 74011250636 HC RX REV CODE- 250/636: Performed by: HOSPITALIST

## 2018-11-21 PROCEDURE — 74011000258 HC RX REV CODE- 258: Performed by: FAMILY MEDICINE

## 2018-11-21 PROCEDURE — 82962 GLUCOSE BLOOD TEST: CPT

## 2018-11-21 PROCEDURE — 71045 X-RAY EXAM CHEST 1 VIEW: CPT

## 2018-11-21 PROCEDURE — 74011000258 HC RX REV CODE- 258: Performed by: EMERGENCY MEDICINE

## 2018-11-21 PROCEDURE — 74011250637 HC RX REV CODE- 250/637: Performed by: FAMILY MEDICINE

## 2018-11-21 RX ORDER — SODIUM CHLORIDE 0.9 % (FLUSH) 0.9 %
10-40 SYRINGE (ML) INJECTION EVERY 8 HOURS
Status: DISCONTINUED | OUTPATIENT
Start: 2018-11-21 | End: 2018-11-28 | Stop reason: HOSPADM

## 2018-11-21 RX ORDER — POTASSIUM CHLORIDE 750 MG/1
20 TABLET, FILM COATED, EXTENDED RELEASE ORAL ONCE
Status: COMPLETED | OUTPATIENT
Start: 2018-11-21 | End: 2018-11-21

## 2018-11-21 RX ORDER — ONDANSETRON 2 MG/ML
4 INJECTION INTRAMUSCULAR; INTRAVENOUS
Status: DISCONTINUED | OUTPATIENT
Start: 2018-11-21 | End: 2018-11-28 | Stop reason: HOSPADM

## 2018-11-21 RX ADMIN — PANTOPRAZOLE SODIUM 40 MG: 40 TABLET, DELAYED RELEASE ORAL at 09:29

## 2018-11-21 RX ADMIN — VANCOMYCIN HYDROCHLORIDE 1750 MG: 10 INJECTION, POWDER, LYOPHILIZED, FOR SOLUTION INTRAVENOUS at 17:16

## 2018-11-21 RX ADMIN — Medication 10 ML: at 14:00

## 2018-11-21 RX ADMIN — LEVOFLOXACIN 750 MG: 5 INJECTION, SOLUTION INTRAVENOUS at 22:28

## 2018-11-21 RX ADMIN — POTASSIUM CHLORIDE 20 MEQ: 750 TABLET, EXTENDED RELEASE ORAL at 09:30

## 2018-11-21 RX ADMIN — SODIUM CHLORIDE 125 ML/HR: 900 INJECTION, SOLUTION INTRAVENOUS at 12:26

## 2018-11-21 RX ADMIN — AZTREONAM 2 G: 2 INJECTION, POWDER, LYOPHILIZED, FOR SOLUTION INTRAMUSCULAR; INTRAVENOUS at 03:34

## 2018-11-21 RX ADMIN — Medication 10 ML: at 22:29

## 2018-11-21 RX ADMIN — ACETAMINOPHEN 650 MG: 325 TABLET ORAL at 17:17

## 2018-11-21 RX ADMIN — ACETAMINOPHEN 650 MG: 325 TABLET ORAL at 06:35

## 2018-11-21 RX ADMIN — VANCOMYCIN HYDROCHLORIDE 1750 MG: 10 INJECTION, POWDER, LYOPHILIZED, FOR SOLUTION INTRAVENOUS at 03:45

## 2018-11-21 RX ADMIN — ONDANSETRON 4 MG: 2 INJECTION INTRAMUSCULAR; INTRAVENOUS at 04:43

## 2018-11-21 RX ADMIN — AZTREONAM 2 G: 2 INJECTION, POWDER, LYOPHILIZED, FOR SOLUTION INTRAMUSCULAR; INTRAVENOUS at 09:31

## 2018-11-21 RX ADMIN — Medication 40 ML: at 06:26

## 2018-11-21 RX ADMIN — ENOXAPARIN SODIUM 40 MG: 40 INJECTION SUBCUTANEOUS at 06:26

## 2018-11-21 RX ADMIN — Medication 10 ML: at 06:27

## 2018-11-21 RX ADMIN — SODIUM CHLORIDE 125 ML/HR: 900 INJECTION, SOLUTION INTRAVENOUS at 20:00

## 2018-11-21 RX ADMIN — AZTREONAM 2 G: 2 INJECTION, POWDER, LYOPHILIZED, FOR SOLUTION INTRAMUSCULAR; INTRAVENOUS at 17:15

## 2018-11-21 NOTE — PROGRESS NOTES
Hospitalist Progress Note Katarina Huff MD 
Answering service: [phone] OR [phone] from in house phone Cell: [phone] Date of Service:  2018 NAME:  Jovita Hartmann :  1975 MRN:  [id number] Admission Summary: A 82-year-old white female with a past medical history of chronic back pain, depression, GERD, morbid obesity, hemorrhoids, irregular heartbeat, thromboembolus, unspecified, presented to the Emergency Department from home with a chief complaint of leg and thigh pain. Per the initial report, patient had reported left inner thigh pain, but now she more is concentrated more on pain in her left leg, which she notes is new onset, starting yesterday evening. The pain was reported to be a 10/10, aggravated with any touch, associated with redness and swelling. Being treated for cellulitis. Interval history / Subjective:  
 
Patient reports she feels better than on admission. Continued leg redness and pain, but no fever since yesterday. Assessment & Plan:  
 
Sepsis with evidence of cellulitis on exam: 
-patient had been started on aztreonam 2 grams IV q8 hours, levofloxacin 750 mg IV q24 hours, and Vancomycin 
aztreonam to  today without ID consult - should be OK without the gram negative coverage for this cellulitis 
-follow-up micro. NGTD Cellulitis: involving bilateral thighs, perineal, and left lower extremity. See above. 
-Wound Care also consulted Fever, resolved. If persists consider need for imaging to assess for ? abscess Leukocytosis, trend. At 13.1 today. Tobacco abuse: Encouraged smoking cessation. -nicotine patch. Morbid obesity: Counseled. Left inguinal lymphadenopathy, likely reactive. Gastroesophageal reflux disease: Continue omeprazole. Code status: FULL 
DVT prophylaxis: lovenox Care Plan discussed with: Patient/Family and Nurse Disposition: Home w/Family and TBD Hospital Problems  Date Reviewed: 11/20/2018 Codes Class Noted POA Cellulitis ICD-10-CM: L03.90 ICD-9-CM: 682.9  11/20/2018 Unknown Leukocytosis ICD-10-CM: V10.864 ICD-9-CM: 288.60  11/20/2018 Unknown Tachycardia ICD-10-CM: R00.0 ICD-9-CM: 785.0  11/20/2018 Unknown * (Principal) SIRS (systemic inflammatory response syndrome) (HCC) ICD-10-CM: R65.10 ICD-9-CM: 995.90  11/20/2018 Unknown Review of Systems: A comprehensive review of systems was negative except for that written in the HPI. Vital Signs:  
 Last 24hrs VS reviewed since prior progress note. Most recent are: 
Visit Vitals /58 (BP 1 Location: Right arm, BP Patient Position: At rest) Pulse (!) 102 Temp 100.4 °F (38 °C) Resp 23 Ht 5' 2\" (1.575 m) Wt 156.1 kg (344 lb 3.2 oz) SpO2 96% BMI 62.96 kg/m² Intake/Output Summary (Last 24 hours) at 11/21/2018 1124 Last data filed at 11/20/2018 1217 Gross per 24 hour Intake  Output 500 ml Net -500 ml Physical Examination:  
 
  
Constitutional:  No acute distress, cooperative ENT:  Neck supple Resp:  CTA bilaterally. No accessory muscle use CV:  Regular rhythm, slightly increased rate, no murmur GI:  Soft, non distended, non tender, normoactive bowel sounds Musculoskeletal:  Warm, LLE with redness, warmth to just below the knee Neurologic:  Moves all extremities. AAOx3 Data Review:  
 Review and/or order of clinical lab test 
Review and/or order of tests in the radiology section of CPT Review and/or order of tests in the medicine section of CPT Labs:  
 
Recent Labs  
  11/21/18 
0330 11/19/18 
2226 WBC 13.1* 12.6* HGB 11.1* 13.5 HCT 34.1* 41.3  221 Recent Labs  
  11/21/18 
0330 11/20/18 
0357 11/19/18 
2226  137 139  
K 3.4* 3.5 4.0  
 106 102 CO2 23 20* 25 BUN 11 14 14 CREA 0.82 0.74 0.95 * 100 98 CA 8.2* 7.4* 8.6 Recent Labs 11/19/18 
2226 SGOT 17 ALT 23 AP 74 TBILI 0.6 TP 7.8 ALB 3.5 GLOB 4.3* Recent Labs  
  11/20/18 
1205 INR 1.3* PTP 13.0* No results for input(s): FE, TIBC, PSAT, FERR in the last 72 hours. No results found for: FOL, RBCF No results for input(s): PH, PCO2, PO2 in the last 72 hours. No results for input(s): CPK, CKNDX, TROIQ in the last 72 hours. No lab exists for component: CPKMB Lab Results Component Value Date/Time Cholesterol, total 197 01/24/2018 11:27 AM  
 HDL Cholesterol 36 (L) 01/24/2018 11:27 AM  
 LDL, calculated 144 (H) 01/24/2018 11:27 AM  
 Triglyceride 84 01/24/2018 11:27 AM  
 
No results found for: [facility] Lab Results Component Value Date/Time Color DARK YELLOW 11/20/2018 01:10 AM  
 Appearance CLOUDY (A) 11/20/2018 01:10 AM  
 Specific gravity 1.024 11/20/2018 01:10 AM  
 Specific gravity <1.005 08/21/2018 08:59 PM  
 pH (UA) 5.5 11/20/2018 01:10 AM  
 Protein NEGATIVE  11/20/2018 01:10 AM  
 Glucose NEGATIVE  11/20/2018 01:10 AM  
 Ketone NEGATIVE  11/20/2018 01:10 AM  
 Bilirubin NEGATIVE  11/20/2018 01:10 AM  
 Urobilinogen 1.0 11/20/2018 01:10 AM  
 Nitrites NEGATIVE  11/20/2018 01:10 AM  
 Leukocyte Esterase TRACE (A) 11/20/2018 01:10 AM  
 Epithelial cells FEW 11/20/2018 01:10 AM  
 Bacteria NEGATIVE  11/20/2018 01:10 AM  
 WBC 0-4 11/20/2018 01:10 AM  
 RBC >100 (H) 11/20/2018 01:10 AM  
 
 
 
Medications Reviewed:  
 
Current Facility-Administered Medications Medication Dose Route Frequency  SALINE PERIPHERAL FLUSH Q8H soln 10-40 mL  10-40 mL InterCATHeter Q8H  
 ondansetron (ZOFRAN) injection 4 mg  4 mg IntraVENous Q4H PRN  
 [START ON 11/22/2018] vancomycin trough 11/22 @ 4:00 - thanks! Other ONCE  pantoprazole (PROTONIX) tablet 40 mg  40 mg Oral DAILY  sodium chloride (NS) flush 5-10 mL  5-10 mL IntraVENous Q8H  
 sodium chloride (NS) flush 5-10 mL  5-10 mL IntraVENous PRN  
  0.9% sodium chloride infusion  125 mL/hr IntraVENous CONTINUOUS  Vancomycin Pharmacy Dosing   Other Rx Dosing/Monitoring  enoxaparin (LOVENOX) injection 40 mg  40 mg SubCUTAneous Q24H  
 nicotine (NICODERM CQ) 21 mg/24 hr patch 1 Patch  1 Patch TransDERmal DAILY  acetaminophen (TYLENOL) tablet 650 mg  650 mg Oral Q6H PRN  
 vancomycin (VANCOCIN) 1750 mg in  ml infusion  1,750 mg IntraVENous Q12H  levoFLOXacin (LEVAQUIN) 750 mg in D5W IVPB  750 mg IntraVENous Q24H  
 aztreonam (AZACTAM) 2 g in 0.9% sodium chloride (MBP/ADV) 100 mL  2 g IntraVENous Q8H  
 
______________________________________________________________________ EXPECTED LENGTH OF STAY: 3d 16h ACTUAL LENGTH OF STAY:          1 Moraima Banuelos MD

## 2018-11-21 NOTE — PROCEDURES
Central Line Placement    Start time: 11/21/2018 2:31 AM  End time: 11/21/2018 2:42 AM  Performed by: Leonette Sacks., MD  Authorized by: Leonette Sacks., MD     Preanesthetic Checklist: patient identified, risks and benefits discussed, anesthesia consent, site marked, patient being monitored and timeout performed    Timeout Time: 02:31       Pre-procedure: All elements of maximal sterile barrier technique followed?  Yes    2% Chlorhexidine for cutaneous antisepsis and Hand hygiene performed prior to catheter insertion              Procedure:   Prep:  ChloraPrep  Location:  Internal jugular  Orientation:  Right  Patient position:  Flat  Catheter type:  Triple lumen  Catheter size:  7 Fr  Catheter length:  16 cm  Number of attempts:  1  Successful placement: Yes      Assessment:   Post-procedure:  Catheter secured, sterile dressing with CHG applied and sterile dressing applied  Assessment:  Blood return through all ports, free fluid flow, placement verified by x-ray and guidewire removal verified  Insertion:  Uncomplicated  Patient tolerance:  Patient tolerated the procedure well with no immediate complications

## 2018-11-21 NOTE — PERIOP NOTES
46 R eceived patient in PACU via bed for Central line placement. Triple lumen catheter  Inserted in RIJ  Without incident,Lines flushed. 
0300 Cxray done ,report called to nurse. VSS B/p 128/62,RR19, and O2sat 98%.

## 2018-11-21 NOTE — PROGRESS NOTES
Pt lost IV access, nursing unable to insert PIV's despite multiple attempts. She is on IV fluids and has multiple IV antibiotics ordered. Will request a central line.

## 2018-11-21 NOTE — PROGRESS NOTES
Patient IV access infiltrated and had to be stuck by various staff members multiple times to get new IV access without success. This infuriated the patient and she was heard speaking very loudly in her room complaining to her son on the telephone about the many times she had been stuck. Called MD and got order for a central line but the patient initially flat out refused the line but later changed her mind and consented for it after she was educated of the benefits of having an IV access. PACU has been notified of the need for a central and will be coming up to get one placed.

## 2018-11-21 NOTE — PROGRESS NOTES
Reason for Admission:   SIRS 
                
RRAT Score:   6 Plan for utilizing home health:    TBD Likelihood of Readmission:  low Transition of Care Plan:           CM met with pt to introduce her to the role of CM and transition of care. She verbalized understanding. This pt lives at home with her  and 2 children. She stated that she is independent at home, still active and driving. CM asked this pt if [facility] She stated that she has applied for it along with Medicaid. CM will follow for any d/c needs. [facility] Care Management Interventions PCP Verified by CM: Yes 
Palliative Care Criteria Met (RRAT>21 & CHF Dx)?: No 
Transition of Care Consult (CM Consult): Discharge Planning MyChart Signup: No 
Discharge Durable Medical Equipment: No 
Physical Therapy Consult: No 
Occupational Therapy Consult: No 
Speech Therapy Consult: No 
Current Support Network: Lives with Spouse Confirm Follow Up Transport: Family Plan discussed with Pt/Family/Caregiver: Yes Freedom of Choice Offered: Yes [employer] Information Provided?: No

## 2018-11-21 NOTE — WOUND CARE
WOCN Note:  
  
Reconsult for left leg cellulitis. 
  
Chart reviewed. Admitted DX:  Left low leg cellulitis. Patient reports that she was scratching an area on her low leg that has psoriasis. Past Medical History:  chronic back pain, depression, GERD, morbid obesity, hemorrhoids, irregular heartbeat, thromboembolus. 
  
Assessment:  
Patient is A&O x 3, communicative, continent and requires assist with repositioning. Will order turn team for assistance with q2 turns. Bed: Bariatric plus Patient has a pure wick. Patient reports pain to left upper and lower leg. Heels intact without erythema and offloaded on pillow. Edema and warm red erythema to left lower leg and left inner upper leg. Dry patch of skin to the anterior left ankle consistent with psoriasis. No open wounds. No exudate. 
  
Skin Care & Pressure Prevention: 
Minimize layers of linen/pads under patient to optimize support surface. Turn/reposition approximately every 2 hours and offload heels. Specialty bed: Bariatric ordered via [facility]. Use only flat sheet and one incontinence pad. Please call Joe Leonardo if not delivered. REF# [id number]. 
  
Discussed above plan with patient & RN. 
  
Transition of Care: Plan to follow weekly and as needed while admitted to hospital. 
  
CLARISSE Clark RN [facility], [facility]. Wound Care Office [phone] Pager [phone]

## 2018-11-22 LAB
ANION GAP SERPL CALC-SCNC: 5 MMOL/L (ref 5–15)
BASOPHILS # BLD: 0 K/UL (ref 0–0.1)
BASOPHILS NFR BLD: 0 % (ref 0–1)
BUN SERPL-MCNC: 8 MG/DL (ref 6–20)
BUN/CREAT SERPL: 12 (ref 12–20)
CALCIUM SERPL-MCNC: 7.8 MG/DL (ref 8.5–10.1)
CHLORIDE SERPL-SCNC: 106 MMOL/L (ref 97–108)
CO2 SERPL-SCNC: 26 MMOL/L (ref 21–32)
CREAT SERPL-MCNC: 0.68 MG/DL (ref 0.55–1.02)
DATE LAST DOSE: NORMAL
DIFFERENTIAL METHOD BLD: ABNORMAL
EOSINOPHIL # BLD: 0 K/UL (ref 0–0.4)
EOSINOPHIL NFR BLD: 0 % (ref 0–7)
ERYTHROCYTE [DISTWIDTH] IN BLOOD BY AUTOMATED COUNT: 12.7 % (ref 11.5–14.5)
GLUCOSE SERPL-MCNC: 115 MG/DL (ref 65–100)
HCT VFR BLD AUTO: 30.8 % (ref 35–47)
HGB BLD-MCNC: 9.9 G/DL (ref 11.5–16)
IMM GRANULOCYTES # BLD: 0.1 K/UL (ref 0–0.04)
IMM GRANULOCYTES NFR BLD AUTO: 1 % (ref 0–0.5)
LYMPHOCYTES # BLD: 0.8 K/UL (ref 0.8–3.5)
LYMPHOCYTES NFR BLD: 7 % (ref 12–49)
MCH RBC QN AUTO: 31 PG (ref 26–34)
MCHC RBC AUTO-ENTMCNC: 32.1 G/DL (ref 30–36.5)
MCV RBC AUTO: 96.6 FL (ref 80–99)
MONOCYTES # BLD: 0.5 K/UL (ref 0–1)
MONOCYTES NFR BLD: 4 % (ref 5–13)
NEUTS SEG # BLD: 10.6 K/UL (ref 1.8–8)
NEUTS SEG NFR BLD: 88 % (ref 32–75)
NRBC # BLD: 0 K/UL (ref 0–0.01)
NRBC BLD-RTO: 0 PER 100 WBC
PLATELET # BLD AUTO: 152 K/UL (ref 150–400)
PMV BLD AUTO: 11.5 FL (ref 8.9–12.9)
POTASSIUM SERPL-SCNC: 3.7 MMOL/L (ref 3.5–5.1)
RBC # BLD AUTO: 3.19 M/UL (ref 3.8–5.2)
REPORTED DOSE,DOSE: NORMAL UNITS
REPORTED DOSE/TIME,TMG: NORMAL
SODIUM SERPL-SCNC: 137 MMOL/L (ref 136–145)
VANCOMYCIN TROUGH SERPL-MCNC: 8.4 UG/ML (ref 5–10)
WBC # BLD AUTO: 12.1 K/UL (ref 3.6–11)

## 2018-11-22 PROCEDURE — 65660000000 HC RM CCU STEPDOWN

## 2018-11-22 PROCEDURE — 74011250636 HC RX REV CODE- 250/636: Performed by: INTERNAL MEDICINE

## 2018-11-22 PROCEDURE — 80048 BASIC METABOLIC PNL TOTAL CA: CPT

## 2018-11-22 PROCEDURE — 74011250637 HC RX REV CODE- 250/637: Performed by: FAMILY MEDICINE

## 2018-11-22 PROCEDURE — 85025 COMPLETE CBC W/AUTO DIFF WBC: CPT

## 2018-11-22 PROCEDURE — 80202 ASSAY OF VANCOMYCIN: CPT

## 2018-11-22 PROCEDURE — 74011250636 HC RX REV CODE- 250/636: Performed by: FAMILY MEDICINE

## 2018-11-22 PROCEDURE — 36415 COLL VENOUS BLD VENIPUNCTURE: CPT

## 2018-11-22 PROCEDURE — 74011250637 HC RX REV CODE- 250/637: Performed by: HOSPITALIST

## 2018-11-22 PROCEDURE — 74011000258 HC RX REV CODE- 258: Performed by: FAMILY MEDICINE

## 2018-11-22 RX ORDER — CLINDAMYCIN PHOSPHATE 900 MG/50ML
900 INJECTION INTRAVENOUS EVERY 8 HOURS
Status: COMPLETED | OUTPATIENT
Start: 2018-11-22 | End: 2018-11-27

## 2018-11-22 RX ORDER — OXYCODONE HYDROCHLORIDE 5 MG/1
5 TABLET ORAL
Status: DISCONTINUED | OUTPATIENT
Start: 2018-11-22 | End: 2018-11-28 | Stop reason: HOSPADM

## 2018-11-22 RX ORDER — CEFAZOLIN SODIUM/WATER 2 G/20 ML
2 SYRINGE (ML) INTRAVENOUS EVERY 8 HOURS
Status: DISCONTINUED | OUTPATIENT
Start: 2018-11-22 | End: 2018-11-28 | Stop reason: HOSPADM

## 2018-11-22 RX ORDER — VANCOMYCIN 1.75 GRAM/500 ML IN 0.9 % SODIUM CHLORIDE INTRAVENOUS
1750 EVERY 8 HOURS
Status: DISCONTINUED | OUTPATIENT
Start: 2018-11-22 | End: 2018-11-22

## 2018-11-22 RX ADMIN — OXYCODONE HYDROCHLORIDE 5 MG: 5 TABLET ORAL at 06:33

## 2018-11-22 RX ADMIN — OXYCODONE HYDROCHLORIDE 5 MG: 5 TABLET ORAL at 13:27

## 2018-11-22 RX ADMIN — Medication 2 G: at 19:14

## 2018-11-22 RX ADMIN — Medication 10 ML: at 06:34

## 2018-11-22 RX ADMIN — Medication 10 ML: at 13:28

## 2018-11-22 RX ADMIN — VANCOMYCIN HYDROCHLORIDE 1750 MG: 10 INJECTION, POWDER, LYOPHILIZED, FOR SOLUTION INTRAVENOUS at 13:36

## 2018-11-22 RX ADMIN — SODIUM CHLORIDE 125 ML/HR: 900 INJECTION, SOLUTION INTRAVENOUS at 12:43

## 2018-11-22 RX ADMIN — PANTOPRAZOLE SODIUM 40 MG: 40 TABLET, DELAYED RELEASE ORAL at 08:38

## 2018-11-22 RX ADMIN — ENOXAPARIN SODIUM 40 MG: 40 INJECTION SUBCUTANEOUS at 06:33

## 2018-11-22 RX ADMIN — CLINDAMYCIN PHOSPHATE 900 MG: 900 INJECTION, SOLUTION INTRAVENOUS at 19:00

## 2018-11-22 RX ADMIN — AZTREONAM 2 G: 2 INJECTION, POWDER, LYOPHILIZED, FOR SOLUTION INTRAMUSCULAR; INTRAVENOUS at 02:26

## 2018-11-22 RX ADMIN — Medication 10 ML: at 06:33

## 2018-11-22 RX ADMIN — OXYCODONE HYDROCHLORIDE 5 MG: 5 TABLET ORAL at 00:56

## 2018-11-22 RX ADMIN — OXYCODONE HYDROCHLORIDE 5 MG: 5 TABLET ORAL at 19:23

## 2018-11-22 RX ADMIN — VANCOMYCIN HYDROCHLORIDE 1750 MG: 10 INJECTION, POWDER, LYOPHILIZED, FOR SOLUTION INTRAVENOUS at 04:04

## 2018-11-22 NOTE — PROGRESS NOTES
Bedside and Verbal shift change report given to Lucio Mendoza (oncoming nurse) by Hanny Gaspar (offgoing nurse). Report included the following information SBAR, Kardex, MAR and Cardiac Rhythm Sinus Tachycardia.

## 2018-11-22 NOTE — PROGRESS NOTES
Pharmacist Note - Vancomycin Dosing Therapy day 3 Indication:  cellulitis (B/L thighs, perineum, LLE) Current regimen: 1750 mg IV q 12 h A Trough Level resulted at 8.4 mcg/mL which was obtained ~9 hrs post-dose. The extrapolated \"true\" trough is approximately 6.26 mcg/mL based on the patient's known kinetics. Goal trough: 10 - 15 mcg/mL Plan: Change to 1750 mg IV q 8 h . Monitor for accumulation in obesity. Pharmacy will continue to monitor this patient daily for changes in clinical status and renal function.

## 2018-11-22 NOTE — PROGRESS NOTES
Hospitalist Progress Note Richard Gaspar MD 
Answering service: [phone] OR [phone] from in house phone Cell: [phone] Date of Service:  2018 NAME:  Jesus Mccartneykeeper :  1975 MRN:  [id number] Admission Summary: A 57-year-old white female with a past medical history of chronic back pain, depression, GERD, morbid obesity, hemorrhoids, irregular heartbeat, thromboembolus, unspecified, presented to the Emergency Department from home with a chief complaint of leg and thigh pain. Per the initial report, patient had reported left inner thigh pain, but now she more is concentrated more on pain in her left leg, which she notes is new onset, starting yesterday evening. The pain was reported to be a 10/10, aggravated with any touch, associated with redness and swelling. Being treated for cellulitis. Interval history / Subjective:  
Patient seen and examined at bedside, feels better than when came in. No fever/rigors, no acute events. Assessment & Plan: #. Cellulitis: bilateral thighs, perineal, and left lower extremity.-Wound Care consulted #. Sever sepsis: POA- 2nd to above 
-patient had been started on aztreonam 2 grams IV q8 hours, levofloxacin 750 mg IV q24 hours, and Vancomycin 
aztreonam to  - should be OK without the gram negative coverage for this cellulitis 
- consult ID, follow-up micro. NGTD - Check CRP. - If not improving will get imaging to r/o abscess Tobacco abuse: Encouraged smoking cessation. -nicotine patch. Morbid obesity: Counseled. Advised on Gastric bypass Gastroesophageal reflux disease: Continue omeprazole. Code status: FULL 
DVT prophylaxis: lovenox Care Plan discussed with: Patient/Family and Nurse Disposition: Home w/Family and TBD Hospital Problems  Date Reviewed: 2018 Codes Class Noted POA Cellulitis ICD-10-CM: L03.90 ICD-9-CM: 682.9  11/20/2018 Unknown Leukocytosis ICD-10-CM: D36.225 ICD-9-CM: 288.60  11/20/2018 Unknown Tachycardia ICD-10-CM: R00.0 ICD-9-CM: 785.0  11/20/2018 Unknown * (Principal) SIRS (systemic inflammatory response syndrome) (HCC) ICD-10-CM: R65.10 ICD-9-CM: 995.90  11/20/2018 Unknown Review of Systems: A comprehensive review of systems was negative except for that written in the HPI. Vital Signs:  
 Last 24hrs VS reviewed since prior progress note. Most recent are: 
Visit Vitals BP (!) 95/39 (BP 1 Location: Right arm, BP Patient Position: At rest) Pulse 80 Temp 98.8 °F (37.1 °C) Resp 17 Ht 5' 2\" (1.575 m) Wt 156.3 kg (344 lb 9.3 oz) SpO2 98% BMI 63.02 kg/m² No intake or output data in the 24 hours ending 11/22/18 1320 Physical Examination:  
 
  
Constitutional:  No acute distress, cooperative ENT:  Neck supple Resp:  CTA bilaterally. No accessory muscle use CV:  Regular rhythm, slightly increased rate, no murmur GI:  Soft, non distended, non tender, normoactive bowel sounds Musculoskeletal:  Warm, LLE with redness, warmth to just below the knee Neurologic:  Moves all extremities. AAOx3 Data Review:  
 Review and/or order of clinical lab test 
Review and/or order of tests in the radiology section of CPT Review and/or order of tests in the medicine section of CPT Labs:  
 
Recent Labs  
  11/22/18 
0237 11/21/18 
0330 WBC 12.1* 13.1* HGB 9.9* 11.1*  
HCT 30.8* 34.1*  
 175 Recent Labs  
  11/22/18 
0237 11/21/18 
0330 11/20/18 
0357  136 137  
K 3.7 3.4* 3.5  104 106 CO2 26 23 20* BUN 8 11 14 CREA 0.68 0.82 0.74 * 107* 100 CA 7.8* 8.2* 7.4* Recent Labs 11/19/18 
2226 SGOT 17 ALT 23 AP 74 TBILI 0.6 TP 7.8 ALB 3.5 GLOB 4.3* Recent Labs  
  11/20/18 
1205 INR 1.3* PTP 13.0*  
  
 No results for input(s): FE, TIBC, PSAT, FERR in the last 72 hours. No results found for: FOL, RBCF No results for input(s): PH, PCO2, PO2 in the last 72 hours. No results for input(s): CPK, CKNDX, TROIQ in the last 72 hours. No lab exists for component: CPKMB Lab Results Component Value Date/Time Cholesterol, total 197 01/24/2018 11:27 AM  
 HDL Cholesterol 36 (L) 01/24/2018 11:27 AM  
 LDL, calculated 144 (H) 01/24/2018 11:27 AM  
 Triglyceride 84 01/24/2018 11:27 AM  
 
Lab Results Component Value Date/Time Glucose (POC) 125 (H) 11/21/2018 04:41 PM  
 Glucose (POC) 129 (H) 11/21/2018 11:33 AM  
 
Lab Results Component Value Date/Time Color DARK YELLOW 11/20/2018 01:10 AM  
 Appearance CLOUDY (A) 11/20/2018 01:10 AM  
 Specific gravity 1.024 11/20/2018 01:10 AM  
 Specific gravity <1.005 08/21/2018 08:59 PM  
 pH (UA) 5.5 11/20/2018 01:10 AM  
 Protein NEGATIVE  11/20/2018 01:10 AM  
 Glucose NEGATIVE  11/20/2018 01:10 AM  
 Ketone NEGATIVE  11/20/2018 01:10 AM  
 Bilirubin NEGATIVE  11/20/2018 01:10 AM  
 Urobilinogen 1.0 11/20/2018 01:10 AM  
 Nitrites NEGATIVE  11/20/2018 01:10 AM  
 Leukocyte Esterase TRACE (A) 11/20/2018 01:10 AM  
 Epithelial cells FEW 11/20/2018 01:10 AM  
 Bacteria NEGATIVE  11/20/2018 01:10 AM  
 WBC 0-4 11/20/2018 01:10 AM  
 RBC >100 (H) 11/20/2018 01:10 AM  
 
 
 
Medications Reviewed:  
 
Current Facility-Administered Medications Medication Dose Route Frequency  oxyCODONE IR (ROXICODONE) tablet 5 mg  5 mg Oral Q6H PRN  
 vancomycin (VANCOCIN) 1750 mg in  ml infusion  1,750 mg IntraVENous Q8H  
 [START ON 11/23/2018] vancomycin trough on 11/23 @ 13:00 - draw immediately prior to dose due @ 13:00   Other ONCE  
 SALINE PERIPHERAL FLUSH Q8H soln 10-40 mL  10-40 mL InterCATHeter Q8H  
 ondansetron (ZOFRAN) injection 4 mg  4 mg IntraVENous Q4H PRN  pantoprazole (PROTONIX) tablet 40 mg  40 mg Oral DAILY  sodium chloride (NS) flush 5-10 mL  5-10 mL IntraVENous Q8H  
 sodium chloride (NS) flush 5-10 mL  5-10 mL IntraVENous PRN  
 0.9% sodium chloride infusion  125 mL/hr IntraVENous CONTINUOUS  Vancomycin Pharmacy Dosing   Other Rx Dosing/Monitoring  enoxaparin (LOVENOX) injection 40 mg  40 mg SubCUTAneous Q24H  
 nicotine (NICODERM CQ) 21 mg/24 hr patch 1 Patch  1 Patch TransDERmal DAILY  acetaminophen (TYLENOL) tablet 650 mg  650 mg Oral Q6H PRN  
 levoFLOXacin (LEVAQUIN) 750 mg in D5W IVPB  750 mg IntraVENous Q24H  
 
______________________________________________________________________ EXPECTED LENGTH OF STAY: 3d 16h ACTUAL LENGTH OF STAY:          2 Rajwinder Ybarra MD

## 2018-11-22 NOTE — PROGRESS NOTES
Problem: Cellulitis Care Plan (Adult) Goal: *Control of acute pain Outcome: Progressing Towards Goal 
Patient with complaints of discomfort to right leg. Pain meds ordered and given. Will monitor pain level today.

## 2018-11-22 NOTE — PROGRESS NOTES
Problem: Falls - Risk of 
Goal: *Absence of Falls Document Debria Check Fall Risk and appropriate interventions in the flowsheet. Outcome: Progressing Towards Goal 
Fall Risk Interventions: 
Mobility Interventions: Patient to call before getting OOB Elimination Interventions: Bed/chair exit alarm Problem: Pressure Injury - Risk of 
Goal: *Prevention of pressure injury Document Garret Scale and appropriate interventions in the flowsheet. Outcome: Progressing Towards Goal 
Pressure Injury Interventions: 
  
 
Moisture Interventions: Internal/External urinary devices Activity Interventions: PT/OT evaluation Mobility Interventions: PT/OT evaluation Nutrition Interventions: Document food/fluid/supplement intake

## 2018-11-23 PROCEDURE — 74011250636 HC RX REV CODE- 250/636: Performed by: FAMILY MEDICINE

## 2018-11-23 PROCEDURE — 74011250637 HC RX REV CODE- 250/637: Performed by: HOSPITALIST

## 2018-11-23 PROCEDURE — 65660000000 HC RM CCU STEPDOWN

## 2018-11-23 PROCEDURE — 74011250637 HC RX REV CODE- 250/637: Performed by: FAMILY MEDICINE

## 2018-11-23 PROCEDURE — 74011250636 HC RX REV CODE- 250/636: Performed by: INTERNAL MEDICINE

## 2018-11-23 RX ADMIN — SODIUM CHLORIDE 125 ML/HR: 900 INJECTION, SOLUTION INTRAVENOUS at 00:11

## 2018-11-23 RX ADMIN — Medication 10 ML: at 23:08

## 2018-11-23 RX ADMIN — CLINDAMYCIN PHOSPHATE 900 MG: 900 INJECTION, SOLUTION INTRAVENOUS at 18:03

## 2018-11-23 RX ADMIN — OXYCODONE HYDROCHLORIDE 5 MG: 5 TABLET ORAL at 13:48

## 2018-11-23 RX ADMIN — Medication 10 ML: at 07:04

## 2018-11-23 RX ADMIN — Medication 2 G: at 18:03

## 2018-11-23 RX ADMIN — Medication 10 ML: at 23:09

## 2018-11-23 RX ADMIN — ENOXAPARIN SODIUM 40 MG: 40 INJECTION SUBCUTANEOUS at 07:04

## 2018-11-23 RX ADMIN — Medication 10 ML: at 14:57

## 2018-11-23 RX ADMIN — OXYCODONE HYDROCHLORIDE 5 MG: 5 TABLET ORAL at 01:31

## 2018-11-23 RX ADMIN — PANTOPRAZOLE SODIUM 40 MG: 40 TABLET, DELAYED RELEASE ORAL at 08:01

## 2018-11-23 RX ADMIN — OXYCODONE HYDROCHLORIDE 5 MG: 5 TABLET ORAL at 23:08

## 2018-11-23 RX ADMIN — CLINDAMYCIN PHOSPHATE 900 MG: 900 INJECTION, SOLUTION INTRAVENOUS at 12:38

## 2018-11-23 RX ADMIN — Medication 2 G: at 02:35

## 2018-11-23 RX ADMIN — ACETAMINOPHEN 650 MG: 325 TABLET ORAL at 00:11

## 2018-11-23 RX ADMIN — CLINDAMYCIN PHOSPHATE 900 MG: 900 INJECTION, SOLUTION INTRAVENOUS at 02:35

## 2018-11-23 RX ADMIN — Medication 2 G: at 12:39

## 2018-11-23 RX ADMIN — OXYCODONE HYDROCHLORIDE 5 MG: 5 TABLET ORAL at 07:25

## 2018-11-23 RX ADMIN — ACETAMINOPHEN 650 MG: 325 TABLET ORAL at 18:10

## 2018-11-23 NOTE — PROGRESS NOTES
Patient currently on IV cefazolin and clindamycin- anticipate patient will stay for IV therapy, patient is uninsured- other options would be possibly the infusion center- unclear at this time the duration of IV therapy. CM will continue to follow.  ABAD Lugo

## 2018-11-23 NOTE — PROGRESS NOTES
Hospitalist Progress Note Eleni Perez MD 
Answering service: [phone] OR [phone] from in house phone Cell: [phone] Date of Service:  2018 NAME:  Patience Rivera :  1975 MRN:  [id number] Admission Summary: A 47-year-old white female with a past medical history of chronic back pain, depression, GERD, morbid obesity, hemorrhoids, irregular heartbeat, thromboembolus, unspecified, presented to the Emergency Department from home with a chief complaint of leg and thigh pain. Per the initial report, patient had reported left inner thigh pain, but now she more is concentrated more on pain in her left leg, which she notes is new onset, starting yesterday evening. The pain was reported to be a 10/10, aggravated with any touch, associated with redness and swelling. Being treated for cellulitis. Interval history / Subjective:  
Patient seen and examined at bedside, feels better, thinks rash improving, dc ivF, ira area Assessment & Plan: #. Cellulitis: bilateral thighs, perineal, and left lower extremity.-Wound Care consulted #. Sever sepsis: POA- 2nd to above 
-patient had been started on aztreonam 2 grams IV q8 hours, levofloxacin 750 mg IV q24 hours, and Vancomycin- ID changed to clindamycin and cefazolin 
aztreonam to  - should be OK without the gram negative coverage for this cellulitis - ID following, follow-up micro. NGTD - Check CRP. Please take picture and upload tomor Tobacco abuse: Encouraged smoking cessation. -nicotine patch. Morbid obesity: Counseled. Advised on Gastric bypass Gastroesophageal reflux disease: Continue omeprazole. Code status: FULL 
DVT prophylaxis: lovenox Care Plan discussed with: Patient/Family and Nurse Disposition: Home w/Family and TBD Hospital Problems  Date Reviewed: 2018 Codes Class Noted POA Cellulitis ICD-10-CM: L03.90 ICD-9-CM: 682.9  11/20/2018 Unknown Leukocytosis ICD-10-CM: M10.258 ICD-9-CM: 288.60  11/20/2018 Unknown Tachycardia ICD-10-CM: R00.0 ICD-9-CM: 785.0  11/20/2018 Unknown * (Principal) SIRS (systemic inflammatory response syndrome) (HCC) ICD-10-CM: R65.10 ICD-9-CM: 995.90  11/20/2018 Unknown Review of Systems: A comprehensive review of systems was negative except for that written in the HPI. Vital Signs:  
 Last 24hrs VS reviewed since prior progress note. Most recent are: 
Visit Vitals /65 (BP 1 Location: Right arm, BP Patient Position: At rest) Pulse 78 Temp 98.8 °F (37.1 °C) Resp 20 Ht 5' 2\" (1.575 m) Wt (!) 162.5 kg (358 lb 3.2 oz) SpO2 95% BMI 65.52 kg/m² No intake or output data in the 24 hours ending 11/23/18 1015 Physical Examination:  
 
  
Constitutional:  No acute distress, cooperative ENT:  Neck supple Resp:  CTA bilaterally. No accessory muscle use CV:  Regular rhythm, slightly increased rate, no murmur GI:  Soft, non distended, non tender, normoactive bowel sounds Musculoskeletal:  Warm, LLE with redness, warmth to just below the knee Neurologic:  Moves all extremities. AAOx3 Data Review:  
 Review and/or order of clinical lab test 
Review and/or order of tests in the radiology section of CPT Review and/or order of tests in the medicine section of CPT Labs:  
 
Recent Labs  
  11/22/18 0237 11/21/18 
0330 WBC 12.1* 13.1* HGB 9.9* 11.1*  
HCT 30.8* 34.1*  
 175 Recent Labs  
  11/22/18 
0237 11/21/18 
0330  136  
K 3.7 3.4*  
 104 CO2 26 23 BUN 8 11 CREA 0.68 0.82 * 107* CA 7.8* 8.2* No results for input(s): SGOT, GPT, ALT, AP, TBIL, TBILI, TP, ALB, GLOB, GGT, AML, LPSE in the last 72 hours. No lab exists for component: AMYP, HLPSE Recent Labs  
  11/20/18 
1205 INR 1.3* PTP 13.0*  
  
 No results for input(s): FE, TIBC, PSAT, FERR in the last 72 hours. No results found for: FOL, RBCF No results for input(s): PH, PCO2, PO2 in the last 72 hours. No results for input(s): CPK, CKNDX, TROIQ in the last 72 hours. No lab exists for component: CPKMB Lab Results Component Value Date/Time Cholesterol, total 197 01/24/2018 11:27 AM  
 HDL Cholesterol 36 (L) 01/24/2018 11:27 AM  
 LDL, calculated 144 (H) 01/24/2018 11:27 AM  
 Triglyceride 84 01/24/2018 11:27 AM  
 
Lab Results Component Value Date/Time Glucose (POC) 125 (H) 11/21/2018 04:41 PM  
 Glucose (POC) 129 (H) 11/21/2018 11:33 AM  
 
Lab Results Component Value Date/Time Color DARK YELLOW 11/20/2018 01:10 AM  
 Appearance CLOUDY (A) 11/20/2018 01:10 AM  
 Specific gravity 1.024 11/20/2018 01:10 AM  
 Specific gravity <1.005 08/21/2018 08:59 PM  
 pH (UA) 5.5 11/20/2018 01:10 AM  
 Protein NEGATIVE  11/20/2018 01:10 AM  
 Glucose NEGATIVE  11/20/2018 01:10 AM  
 Ketone NEGATIVE  11/20/2018 01:10 AM  
 Bilirubin NEGATIVE  11/20/2018 01:10 AM  
 Urobilinogen 1.0 11/20/2018 01:10 AM  
 Nitrites NEGATIVE  11/20/2018 01:10 AM  
 Leukocyte Esterase TRACE (A) 11/20/2018 01:10 AM  
 Epithelial cells FEW 11/20/2018 01:10 AM  
 Bacteria NEGATIVE  11/20/2018 01:10 AM  
 WBC 0-4 11/20/2018 01:10 AM  
 RBC >100 (H) 11/20/2018 01:10 AM  
 
 
 
Medications Reviewed:  
 
Current Facility-Administered Medications Medication Dose Route Frequency  oxyCODONE IR (ROXICODONE) tablet 5 mg  5 mg Oral Q6H PRN  
 clindamycin (CLEOCIN) 900mg D5W 50mL IVPB (premix)  900 mg IntraVENous Q8H  
 ceFAZolin (ANCEF) 2 g/20 mL in sterile water IV syringe  2 g IntraVENous Q8H  
 SALINE PERIPHERAL FLUSH Q8H soln 10-40 mL  10-40 mL InterCATHeter Q8H  
 ondansetron (ZOFRAN) injection 4 mg  4 mg IntraVENous Q4H PRN  pantoprazole (PROTONIX) tablet 40 mg  40 mg Oral DAILY  sodium chloride (NS) flush 5-10 mL  5-10 mL IntraVENous Q8H  
  sodium chloride (NS) flush 5-10 mL  5-10 mL IntraVENous PRN  
 enoxaparin (LOVENOX) injection 40 mg  40 mg SubCUTAneous Q24H  
 nicotine (NICODERM CQ) 21 mg/24 hr patch 1 Patch  1 Patch TransDERmal DAILY  acetaminophen (TYLENOL) tablet 650 mg  650 mg Oral Q6H PRN  
 
______________________________________________________________________ EXPECTED LENGTH OF STAY: 3d 16h ACTUAL LENGTH OF STAY:          3 Dion Richter MD

## 2018-11-23 NOTE — PROGRESS NOTES
Problem: Cellulitis Care Plan (Adult) Goal: *Control of acute pain Outcome: Progressing Towards Goal 
Patient with decreased pain today. Will monitor pain levels.

## 2018-11-23 NOTE — PROGRESS NOTES
Problem: Falls - Risk of 
Goal: *Absence of Falls Document Arielle Martínez Fall Risk and appropriate interventions in the flowsheet. Outcome: Progressing Towards Goal 
Fall Risk Interventions: 
Mobility Interventions: Communicate number of staff needed for ambulation/transfer, Assess mobility with egress test, Patient to call before getting OOB, PT Consult for mobility concerns, PT Consult for assist device competence, Utilize walker, cane, or other assistive device Medication Interventions: Evaluate medications/consider consulting pharmacy, Patient to call before getting OOB, Teach patient to arise slowly Elimination Interventions: Call light in reach, Patient to call for help with toileting needs, Toileting schedule/hourly rounds Problem: Pressure Injury - Risk of 
Goal: *Prevention of pressure injury Document Garret Scale and appropriate interventions in the flowsheet. Outcome: Progressing Towards Goal 
Pressure Injury Interventions: 
  
 
Moisture Interventions: Absorbent underpads, Internal/External urinary devices, Minimize layers Activity Interventions: Increase time out of bed, Pressure redistribution bed/mattress(bed type), PT/OT evaluation Mobility Interventions: HOB 30 degrees or less, Pressure redistribution bed/mattress (bed type), PT/OT evaluation Nutrition Interventions: Document food/fluid/supplement intake

## 2018-11-23 NOTE — CONSULTS
ID consult    NAME:  Sinan Green                      :   1975                       MRN:   [id number]   Date/Time:  2018 9:35 PM  Subjective:   REASON FOR CONSULT:   cellulitis  Mariposa Adames is a 21-year-old white female with a history of bilateral lymphedema of the legs  Eczema ,depression morbid obesity ,back pain presented to the emergency room on  with pain in her left leg. Patient states on Monday the  she noted pain and swelling of the left leg. She also saw redness of the left leg. As it was getting worse she came to the emergency department. She has never had a similar issue in the past.  She states she has eczema and has patches on her legs and as it was itchy her  was scratching it for her as she could not reach the leg. She also had fever and chills. She denies any other trauma. She is out of work for the last 2 months. She is a smoker and smokes a pack a day. In the emergency room her temperature was 102. Her WBC count was 12.6. Blood cultures were sent and she was started on vancomycin and Levaquin. She has history of penicillin allergy. She states she got hives after taking penicillin when she was a child. .    Past medical history.   Back pain  Cough  GERD  Thromboembolus DVT  Hemorrhoids    Past surgical history none       obacco Use    Smoking status: Current Every Day Smoker     Packs/day: 1.50    Smokeless tobacco: Current User   Substance and Sexual Activity    Alcohol use: No    Drug use: No    Sexual activity: Yes     Partners: Male     Family History   Problem Relation Age of Onset    Mental Retardation Mother     Heart Disease Mother     COPD Mother     Cancer Maternal Grandmother     Diabetes Maternal Grandmother      Allergies   Allergen Reactions    Pcn [Penicillins] Anaphylaxis     Had HIVES  Has taken keflex before    Bactrim [Sulfamethoprim] Hives    Doxycycline Hives    Ibuprofen Hives    Naprosyn [Naproxen] Nausea and Vomiting     Meds at home. Mometasone cream for eczema         REVIEW OF SYSTEMS:     Const: fever,  chills, negative weight loss  Eyes:  negative diplopia or visual changes, negative eye pain  ENT:  negative coryza, negative sore throat  Resp:  baseline cough, no hemoptysis, no dyspnea  Cards: negative for chest pain, palpitations, lower extremity edema  : negative for frequency, dysuria and hematuria  GI: Negative for abdominal apin, diarrhea, bleeding, constipation  Skin:  negative for rash and pruritus  Heme: negative for easy bruising and gum/nose bleeding  MS: negative for myalgias, arthralgias,  Has back pain Neurolo:negative for headaches, dizziness, vertigo, memory problems   Psych: negative for feelings of anxiety, depression   Endocrine: No polyuria or polydipsia      Objective:   VITALS:    Visit Vitals  /56 (BP 1 Location: Right arm, BP Patient Position: At rest)   Pulse 96   Temp 97.8 °F (36.6 °C)   Resp 17   Ht 5' 2\" (1.575 m)   Wt 344 lb 9.3 oz (156.3 kg)   SpO2 100%   BMI 63.02 kg/m²       PHYSICAL EXAM:   General:    Alert, cooperative, no distress, appears stated age. Morbid obesity  Head:   Normocephalic, without obvious abnormality, atraumatic. Eyes:   Conjunctivae clear, anicteric sclerae. Pupils are equal  ENT  Nares normal. No drainage or sinus tenderness. Lips, mucosa, and tongue normal.  No Thrush  Neck:  Supple, symmetrical,  no adenopathy, thyroid: non tender    no carotid bruit and no JVD. Back:    No CVA tenderness. Lungs:   Clear to auscultation bilaterally. No Wheezing or Rhonchi. No rales. Heart:   Regular rate and rhythm,  no murmur, rub or gallop. Abdomen:   Soft, non-tender,not distended. Bowel sounds normal. No masses  Extremities: Bilateral lymphedema of the legs. left leg below the knee erythematous tender to touch. No necrosis or bulla. A patch of dry skin over the ankle.           Skin:     Patch of dry skin over the elbows  Lymph: Cervical, supraclavicular normal.  Neurologic: Grossly non-focal    Pertinent Labs  WBC 12.1  Hb 9.9    CR 0.68  Blood culture no growth so far  IMAGING RESULTS:  Doppler: No evidence of DVT both legs    Impression/Recommendation  62-year-old white female with history of lymphedema legs and eczema presents with fever swelling and erythema with tenderness of the left leg. Cellulitis of the left leg. Common organisms are group A streptococcus and Staphylococcus. Entry point likely the dry patchy eczematous skin on the front of ankle joint. Unlikely to be MRSA. She is currently on vancomycin and Levaquin and has not made much of her progress. Will discontinue both and started on cefazolin and clindamycin. She has tolerated Keflex in the past.  She may need this combination intravenously for another 48 hours. Depending on her progress she may be able to get Keflex as outpatient eventually. The leg has to be kept elevated. And she will need compression stockings once the infection is resolved. May benefit from lymphedema clinic appointment. Smoker. One pack per day. Chronic cough because of that. She states she does not need any nicotine patches that she does not have any craving. Counseled about stopping smoking. Discussed with patient. Please call the ID physician on call for the next 3 days if needed.

## 2018-11-24 LAB
ANION GAP SERPL CALC-SCNC: 6 MMOL/L (ref 5–15)
BUN SERPL-MCNC: 7 MG/DL (ref 6–20)
BUN/CREAT SERPL: 11 (ref 12–20)
CALCIUM SERPL-MCNC: 7.8 MG/DL (ref 8.5–10.1)
CHLORIDE SERPL-SCNC: 103 MMOL/L (ref 97–108)
CO2 SERPL-SCNC: 30 MMOL/L (ref 21–32)
CREAT SERPL-MCNC: 0.62 MG/DL (ref 0.55–1.02)
ERYTHROCYTE [DISTWIDTH] IN BLOOD BY AUTOMATED COUNT: 12.7 % (ref 11.5–14.5)
GLUCOSE SERPL-MCNC: 97 MG/DL (ref 65–100)
HCT VFR BLD AUTO: 29.3 % (ref 35–47)
HGB BLD-MCNC: 9.4 G/DL (ref 11.5–16)
MCH RBC QN AUTO: 30.9 PG (ref 26–34)
MCHC RBC AUTO-ENTMCNC: 32.1 G/DL (ref 30–36.5)
MCV RBC AUTO: 96.4 FL (ref 80–99)
NRBC # BLD: 0 K/UL (ref 0–0.01)
NRBC BLD-RTO: 0 PER 100 WBC
PLATELET # BLD AUTO: 195 K/UL (ref 150–400)
PMV BLD AUTO: 11.2 FL (ref 8.9–12.9)
POTASSIUM SERPL-SCNC: 3.5 MMOL/L (ref 3.5–5.1)
RBC # BLD AUTO: 3.04 M/UL (ref 3.8–5.2)
SODIUM SERPL-SCNC: 139 MMOL/L (ref 136–145)
WBC # BLD AUTO: 8.3 K/UL (ref 3.6–11)

## 2018-11-24 PROCEDURE — 74011250637 HC RX REV CODE- 250/637: Performed by: HOSPITALIST

## 2018-11-24 PROCEDURE — 80048 BASIC METABOLIC PNL TOTAL CA: CPT

## 2018-11-24 PROCEDURE — 74011250636 HC RX REV CODE- 250/636: Performed by: INTERNAL MEDICINE

## 2018-11-24 PROCEDURE — 36415 COLL VENOUS BLD VENIPUNCTURE: CPT

## 2018-11-24 PROCEDURE — 94762 N-INVAS EAR/PLS OXIMTRY CONT: CPT

## 2018-11-24 PROCEDURE — 74011250637 HC RX REV CODE- 250/637: Performed by: FAMILY MEDICINE

## 2018-11-24 PROCEDURE — 94760 N-INVAS EAR/PLS OXIMETRY 1: CPT

## 2018-11-24 PROCEDURE — 65270000032 HC RM SEMIPRIVATE

## 2018-11-24 PROCEDURE — 74011250636 HC RX REV CODE- 250/636: Performed by: FAMILY MEDICINE

## 2018-11-24 PROCEDURE — 85027 COMPLETE CBC AUTOMATED: CPT

## 2018-11-24 RX ORDER — DIPHENHYDRAMINE HCL 25 MG
25 CAPSULE ORAL
Status: DISCONTINUED | OUTPATIENT
Start: 2018-11-24 | End: 2018-11-28 | Stop reason: HOSPADM

## 2018-11-24 RX ADMIN — ACETAMINOPHEN 650 MG: 325 TABLET ORAL at 06:03

## 2018-11-24 RX ADMIN — Medication 10 ML: at 08:24

## 2018-11-24 RX ADMIN — Medication 2 G: at 12:20

## 2018-11-24 RX ADMIN — Medication 10 ML: at 21:23

## 2018-11-24 RX ADMIN — OXYCODONE HYDROCHLORIDE 5 MG: 5 TABLET ORAL at 18:18

## 2018-11-24 RX ADMIN — Medication 10 ML: at 21:22

## 2018-11-24 RX ADMIN — CLINDAMYCIN PHOSPHATE 900 MG: 900 INJECTION, SOLUTION INTRAVENOUS at 12:20

## 2018-11-24 RX ADMIN — Medication 10 ML: at 13:06

## 2018-11-24 RX ADMIN — OXYCODONE HYDROCHLORIDE 5 MG: 5 TABLET ORAL at 12:09

## 2018-11-24 RX ADMIN — CLINDAMYCIN PHOSPHATE 900 MG: 900 INJECTION, SOLUTION INTRAVENOUS at 02:31

## 2018-11-24 RX ADMIN — Medication 2 G: at 20:19

## 2018-11-24 RX ADMIN — CLINDAMYCIN PHOSPHATE 900 MG: 900 INJECTION, SOLUTION INTRAVENOUS at 20:20

## 2018-11-24 RX ADMIN — DIPHENHYDRAMINE HYDROCHLORIDE 25 MG: 25 CAPSULE ORAL at 21:22

## 2018-11-24 RX ADMIN — Medication 2 G: at 02:31

## 2018-11-24 RX ADMIN — PANTOPRAZOLE SODIUM 40 MG: 40 TABLET, DELAYED RELEASE ORAL at 08:22

## 2018-11-24 RX ADMIN — ENOXAPARIN SODIUM 40 MG: 40 INJECTION SUBCUTANEOUS at 08:22

## 2018-11-24 NOTE — PROGRESS NOTES
Problem: Cellulitis Care Plan (Adult) Goal: *Skin integrity maintained Outcome: Progressing Towards Goal 
Skin integrity maintained. Redness and pain decreasing. Will monitor left lower extremity for any signs of skin breakdown

## 2018-11-24 NOTE — PROGRESS NOTES
Problem: Falls - Risk of 
Goal: *Absence of Falls Document Lenny Casper Fall Risk and appropriate interventions in the flowsheet. Outcome: Progressing Towards Goal 
Fall Risk Interventions: 
Mobility Interventions: Communicate number of staff needed for ambulation/transfer, Patient to call before getting OOB, PT Consult for mobility concerns Medication Interventions: Evaluate medications/consider consulting pharmacy, Patient to call before getting OOB, Teach patient to arise slowly Elimination Interventions: Call light in reach, Patient to call for help with toileting needs, Toileting schedule/hourly rounds Problem: Pressure Injury - Risk of 
Goal: *Prevention of pressure injury Document Garret Scale and appropriate interventions in the flowsheet. Outcome: Progressing Towards Goal 
Pressure Injury Interventions: 
  
 
Moisture Interventions: Absorbent underpads, Check for incontinence Q2 hours and as needed, Internal/External urinary devices, Maintain skin hydration (lotion/cream), Minimize layers, Moisture barrier Activity Interventions: Increase time out of bed, Pressure redistribution bed/mattress(bed type), PT/OT evaluation Mobility Interventions: HOB 30 degrees or less, Float heels, Pressure redistribution bed/mattress (bed type), PT/OT evaluation, Turn and reposition approx. every two hours(pillow and wedges) Nutrition Interventions: Document food/fluid/supplement intake

## 2018-11-24 NOTE — PROGRESS NOTES
Bedside shift change report given to [address] [facility] [address] (oncoming nurse) by [facility] (offgoing nurse). Report included the following information SBAR, MAR, Recent Results and Cardiac Rhythm NSR.

## 2018-11-24 NOTE — PROGRESS NOTES
Hospitalist Progress Note Toya Cordova MD 
Answering service: [phone] OR [phone] from in house phone Cell: [phone] Date of Service:  2018 NAME:  Annia Cam :  1975 MRN:  [id number] Admission Summary: A 44-year-old white female with a past medical history of chronic back pain, depression, GERD, morbid obesity, hemorrhoids, irregular heartbeat, thromboembolus, unspecified, presented to the Emergency Department from home with a chief complaint of leg and thigh pain. Per the initial report, patient had reported left inner thigh pain, but now she more is concentrated more on pain in her left leg, which she notes is new onset, starting yesterday evening. The pain was reported to be a 10/10, aggravated with any touch, associated with redness and swelling. Being treated for cellulitis. Interval history / Subjective:  
 
Patient seen and examined at bedside. Rash markedly improved. Hoping to get out of bed today. Assessment & Plan:  
 
Cellulitis, improved: bilateral thighs, perineal, and left lower extremity. 
-Wound Care consulted Severe sepsis: POA- 2nd to above 
-patient had been started on aztreonam 2 grams IV q8 hours, levofloxacin 750 mg IV q24 hours, and Vancomycin - ID changed to clindamycin and cefazolin - ID following, follow-up micro. NGTD  
- markedly improved Tobacco abuse: Encouraged smoking cessation. -nicotine patch. Morbid obesity: Counseled on lifestyle modifications. Gastroesophageal reflux disease: Continue omeprazole. Code status: FULL 
DVT prophylaxis: lovenox Care Plan discussed with: Patient/Family and Nurse Disposition: Home w/Family and TBD Hospital Problems  Date Reviewed: 2018 Codes Class Noted POA Cellulitis ICD-10-CM: L03.90 ICD-9-CM: 682.9  2018 Unknown  Leukocytosis ICD-10-CM: T58.044 
 ICD-9-CM: 288.60  11/20/2018 Unknown Tachycardia ICD-10-CM: R00.0 ICD-9-CM: 785.0  11/20/2018 Unknown * (Principal) SIRS (systemic inflammatory response syndrome) (HCC) ICD-10-CM: R65.10 ICD-9-CM: 995.90  11/20/2018 Unknown Review of Systems: A comprehensive review of systems was negative except for that written in the HPI. Vital Signs:  
 Last 24hrs VS reviewed since prior progress note. Most recent are: 
Visit Vitals /49 (BP 1 Location: Right arm, BP Patient Position: At rest) Pulse 80 Temp 99.1 °F (37.3 °C) Resp 20 Ht 5' 2\" (1.575 m) Wt (!) 162.1 kg (357 lb 6.4 oz) SpO2 94% BMI 65.37 kg/m² No intake or output data in the 24 hours ending 11/24/18 0748 Physical Examination:  
 
  
Constitutional:  No acute distress, cooperative ENT:  Neck supple Resp:  CTA bilaterally. No accessory muscle use CV:  Regular rhythm, slightly increased rate, no murmur GI:  Soft, non distended, non tender, normoactive bowel sounds Musculoskeletal:  LLE with redness - significantly improved Neurologic:  Moves all extremities. AAOx3 Data Review:  
 Review and/or order of clinical lab test 
Review and/or order of tests in the radiology section of CPT Review and/or order of tests in the medicine section of CPT Labs:  
 
Recent Labs  
  11/24/18 
0234 11/22/18 
4186 WBC 8.3 12.1* HGB 9.4* 9.9*  
HCT 29.3* 30.8*  152 Recent Labs  
  11/24/18 
0234 11/22/18 
9115  137  
K 3.5 3.7  106 CO2 30 26 BUN 7 8 CREA 0.62 0.68 GLU 97 115* CA 7.8* 7.8* No results for input(s): SGOT, GPT, ALT, AP, TBIL, TBILI, TP, ALB, GLOB, GGT, AML, LPSE in the last 72 hours. No lab exists for component: AMYP, HLPSE No results for input(s): INR, PTP, APTT in the last 72 hours. No lab exists for component: INREXT, INREXT No results for input(s): FE, TIBC, PSAT, FERR in the last 72 hours. No results found for: FOL, RBCF No results for input(s): PH, PCO2, PO2 in the last 72 hours. No results for input(s): CPK, CKNDX, TROIQ in the last 72 hours. No lab exists for component: CPKMB Lab Results Component Value Date/Time Cholesterol, total 197 01/24/2018 11:27 AM  
 HDL Cholesterol 36 (L) 01/24/2018 11:27 AM  
 LDL, calculated 144 (H) 01/24/2018 11:27 AM  
 Triglyceride 84 01/24/2018 11:27 AM  
 
Lab Results Component Value Date/Time Glucose (POC) 125 (H) 11/21/2018 04:41 PM  
 Glucose (POC) 129 (H) 11/21/2018 11:33 AM  
 
Lab Results Component Value Date/Time Color DARK YELLOW 11/20/2018 01:10 AM  
 Appearance CLOUDY (A) 11/20/2018 01:10 AM  
 Specific gravity 1.024 11/20/2018 01:10 AM  
 Specific gravity <1.005 08/21/2018 08:59 PM  
 pH (UA) 5.5 11/20/2018 01:10 AM  
 Protein NEGATIVE  11/20/2018 01:10 AM  
 Glucose NEGATIVE  11/20/2018 01:10 AM  
 Ketone NEGATIVE  11/20/2018 01:10 AM  
 Bilirubin NEGATIVE  11/20/2018 01:10 AM  
 Urobilinogen 1.0 11/20/2018 01:10 AM  
 Nitrites NEGATIVE  11/20/2018 01:10 AM  
 Leukocyte Esterase TRACE (A) 11/20/2018 01:10 AM  
 Epithelial cells FEW 11/20/2018 01:10 AM  
 Bacteria NEGATIVE  11/20/2018 01:10 AM  
 WBC 0-4 11/20/2018 01:10 AM  
 RBC >100 (H) 11/20/2018 01:10 AM  
 
 
 
Medications Reviewed:  
 
Current Facility-Administered Medications Medication Dose Route Frequency  oxyCODONE IR (ROXICODONE) tablet 5 mg  5 mg Oral Q6H PRN  
 clindamycin (CLEOCIN) 900mg D5W 50mL IVPB (premix)  900 mg IntraVENous Q8H  
 ceFAZolin (ANCEF) 2 g/20 mL in sterile water IV syringe  2 g IntraVENous Q8H  
 SALINE PERIPHERAL FLUSH Q8H soln 10-40 mL  10-40 mL InterCATHeter Q8H  
 ondansetron (ZOFRAN) injection 4 mg  4 mg IntraVENous Q4H PRN  pantoprazole (PROTONIX) tablet 40 mg  40 mg Oral DAILY  sodium chloride (NS) flush 5-10 mL  5-10 mL IntraVENous Q8H  
 sodium chloride (NS) flush 5-10 mL  5-10 mL IntraVENous PRN  
  enoxaparin (LOVENOX) injection 40 mg  40 mg SubCUTAneous Q24H  
 nicotine (NICODERM CQ) 21 mg/24 hr patch 1 Patch  1 Patch TransDERmal DAILY  acetaminophen (TYLENOL) tablet 650 mg  650 mg Oral Q6H PRN  
 
______________________________________________________________________ EXPECTED LENGTH OF STAY: 3d 16h ACTUAL LENGTH OF STAY:          4 Gera Martinez MD

## 2018-11-24 NOTE — PROGRESS NOTES
5604 Patient called out stating headache is severe. Describes pain as 10/10 shooting, stabbing and lots of pressure. Patient believes that pain medication taking for leg, 5 mg Kyle, might be causing headaches. Patient also stated that pain didn't start until central line was placed. This nurse said would call MD 
 
[phone] with hospitalist, who recommended ibuprofen. Explained that patient had allergy to medication. Asked to try dose of Fioricet.  MD declined and said could give a dose of kyle at this time and to pass information on to dayshift hospitalist.

## 2018-11-24 NOTE — PROGRESS NOTES
TRANSFER - IN REPORT: 
 
Verbal report received from Beck Camejo RN(name) on [employer]  being received from 6S(unit) for routine progression of care Report consisted of patients Situation, Background, Assessment and  
Recommendations(SBAR). Information from the following report(s) SBAR, Kardex, Intake/Output and MAR was reviewed with the receiving nurse. Opportunity for questions and clarification was provided. Assessment completed upon patients arrival to unit and care assumed.

## 2018-11-25 LAB
ANION GAP SERPL CALC-SCNC: 9 MMOL/L (ref 5–15)
BACTERIA SPEC CULT: NORMAL
BASOPHILS # BLD: 0.1 K/UL (ref 0–0.1)
BASOPHILS NFR BLD: 1 % (ref 0–1)
BUN SERPL-MCNC: 7 MG/DL (ref 6–20)
BUN/CREAT SERPL: 12 (ref 12–20)
CALCIUM SERPL-MCNC: 8.2 MG/DL (ref 8.5–10.1)
CHLORIDE SERPL-SCNC: 102 MMOL/L (ref 97–108)
CO2 SERPL-SCNC: 29 MMOL/L (ref 21–32)
CREAT SERPL-MCNC: 0.59 MG/DL (ref 0.55–1.02)
DIFFERENTIAL METHOD BLD: ABNORMAL
EOSINOPHIL # BLD: 0 K/UL (ref 0–0.4)
EOSINOPHIL NFR BLD: 0 % (ref 0–7)
ERYTHROCYTE [DISTWIDTH] IN BLOOD BY AUTOMATED COUNT: 12.7 % (ref 11.5–14.5)
GLUCOSE SERPL-MCNC: 101 MG/DL (ref 65–100)
HCT VFR BLD AUTO: 29.1 % (ref 35–47)
HGB BLD-MCNC: 9.4 G/DL (ref 11.5–16)
IMM GRANULOCYTES # BLD: 0 K/UL
IMM GRANULOCYTES NFR BLD AUTO: 0 %
LYMPHOCYTES # BLD: 0.3 K/UL (ref 0.8–3.5)
LYMPHOCYTES NFR BLD: 3 % (ref 12–49)
MCH RBC QN AUTO: 30.9 PG (ref 26–34)
MCHC RBC AUTO-ENTMCNC: 32.3 G/DL (ref 30–36.5)
MCV RBC AUTO: 95.7 FL (ref 80–99)
METAMYELOCYTES NFR BLD MANUAL: 2 %
MONOCYTES # BLD: 0.6 K/UL (ref 0–1)
MONOCYTES NFR BLD: 6 % (ref 5–13)
NEUTS BAND NFR BLD MANUAL: 3 % (ref 0–6)
NEUTS SEG # BLD: 8.5 K/UL (ref 1.8–8)
NEUTS SEG NFR BLD: 85 % (ref 32–75)
NRBC # BLD: 0 K/UL (ref 0–0.01)
NRBC BLD-RTO: 0 PER 100 WBC
PLATELET # BLD AUTO: 241 K/UL (ref 150–400)
PLATELET COMMENTS,PCOM: ABNORMAL
PMV BLD AUTO: 11.1 FL (ref 8.9–12.9)
POTASSIUM SERPL-SCNC: 3.5 MMOL/L (ref 3.5–5.1)
RBC # BLD AUTO: 3.04 M/UL (ref 3.8–5.2)
RBC MORPH BLD: ABNORMAL
SERVICE CMNT-IMP: NORMAL
SODIUM SERPL-SCNC: 140 MMOL/L (ref 136–145)
WBC # BLD AUTO: 9.7 K/UL (ref 3.6–11)

## 2018-11-25 PROCEDURE — 74011250637 HC RX REV CODE- 250/637: Performed by: FAMILY MEDICINE

## 2018-11-25 PROCEDURE — 74011250636 HC RX REV CODE- 250/636: Performed by: INTERNAL MEDICINE

## 2018-11-25 PROCEDURE — 36415 COLL VENOUS BLD VENIPUNCTURE: CPT

## 2018-11-25 PROCEDURE — 97161 PT EVAL LOW COMPLEX 20 MIN: CPT

## 2018-11-25 PROCEDURE — 74011000250 HC RX REV CODE- 250: Performed by: INTERNAL MEDICINE

## 2018-11-25 PROCEDURE — 74011250637 HC RX REV CODE- 250/637: Performed by: HOSPITALIST

## 2018-11-25 PROCEDURE — 80048 BASIC METABOLIC PNL TOTAL CA: CPT

## 2018-11-25 PROCEDURE — 77030038269 HC DRN EXT URIN PURWCK BARD -A

## 2018-11-25 PROCEDURE — 85025 COMPLETE CBC W/AUTO DIFF WBC: CPT

## 2018-11-25 PROCEDURE — 65270000032 HC RM SEMIPRIVATE

## 2018-11-25 PROCEDURE — 74011250636 HC RX REV CODE- 250/636: Performed by: FAMILY MEDICINE

## 2018-11-25 PROCEDURE — 97116 GAIT TRAINING THERAPY: CPT

## 2018-11-25 RX ORDER — POLYETHYLENE GLYCOL 3350 17 G/17G
17 POWDER, FOR SOLUTION ORAL DAILY
Status: DISCONTINUED | OUTPATIENT
Start: 2018-11-25 | End: 2018-11-28 | Stop reason: HOSPADM

## 2018-11-25 RX ADMIN — Medication 10 ML: at 17:06

## 2018-11-25 RX ADMIN — Medication 10 ML: at 07:03

## 2018-11-25 RX ADMIN — CLINDAMYCIN PHOSPHATE 900 MG: 900 INJECTION, SOLUTION INTRAVENOUS at 18:52

## 2018-11-25 RX ADMIN — PANTOPRAZOLE SODIUM 40 MG: 40 TABLET, DELAYED RELEASE ORAL at 10:32

## 2018-11-25 RX ADMIN — Medication 2 G: at 03:22

## 2018-11-25 RX ADMIN — OXYCODONE HYDROCHLORIDE 5 MG: 5 TABLET ORAL at 03:20

## 2018-11-25 RX ADMIN — POLYETHYLENE GLYCOL 3350 17 G: 17 POWDER, FOR SOLUTION ORAL at 17:04

## 2018-11-25 RX ADMIN — Medication 2 G: at 18:52

## 2018-11-25 RX ADMIN — Medication 2 G: at 10:33

## 2018-11-25 RX ADMIN — CLINDAMYCIN PHOSPHATE 900 MG: 900 INJECTION, SOLUTION INTRAVENOUS at 10:33

## 2018-11-25 RX ADMIN — WATER 1 MG: 1 INJECTION INTRAMUSCULAR; INTRAVENOUS; SUBCUTANEOUS at 07:32

## 2018-11-25 RX ADMIN — OXYCODONE HYDROCHLORIDE 5 MG: 5 TABLET ORAL at 18:52

## 2018-11-25 RX ADMIN — ENOXAPARIN SODIUM 40 MG: 40 INJECTION SUBCUTANEOUS at 07:02

## 2018-11-25 RX ADMIN — Medication 10 ML: at 20:35

## 2018-11-25 RX ADMIN — CLINDAMYCIN PHOSPHATE 900 MG: 900 INJECTION, SOLUTION INTRAVENOUS at 03:17

## 2018-11-25 RX ADMIN — Medication 10 ML: at 20:34

## 2018-11-25 RX ADMIN — OXYCODONE HYDROCHLORIDE 5 MG: 5 TABLET ORAL at 10:32

## 2018-11-25 NOTE — PROGRESS NOTES
Problem: Mobility Impaired (Adult and Pediatric) Goal: *Acute Goals and Plan of Care (Insert Text) Physical Therapy Goals Initiated 11/25/2018 1. Patient will move from supine to sit and sit to supine , scoot up and down and roll side to side in bed with independence within 7 day(s). 2.  Patient will transfer from bed to chair and chair to bed with modified independence using the least restrictive device within 7 day(s). 3.  Patient will perform sit to stand with modified independence within 7 day(s). 4.  Patient will ambulate with modified independence for 150 feet with the least restrictive device within 7 day(s). 5.  Patient will ascend/descend 4 stairs with 1 handrail(s) with modified independence within 7 day(s). physical Therapy EVALUATION Patient: Huey Damico (49 y.o. female) Date: 11/25/2018 Primary Diagnosis: SIRS (systemic inflammatory response syndrome) (HCC) Tachycardia Leukocytosis Cellulitis Procedure(s) (LRB): 
INFUSION CATHETER INSERTION (Bilateral) 4 Days Post-Op Precautions:     
 
ASSESSMENT : 
Based on the objective data described below, the patient presents with increased L LE pain 2* cellulitis limiting independence with functional mobility compared to baseline. PTA patient was independent with mobility and lived with family. She's overall SBA for bed mobility and CGA for transfers with RW. Patient ambulated 75 feet with RW and CGA. Patient ambulating with step to pattern with decreased weightbearing on L. Patient requires B UE support to RW for ambulation secondary to pain. Will follow 3 x week to hopefully progress off RW as pain improves, otherwise patient will need RW ordered prior to discharge. Likely no PT services needed at discharge. Patient will benefit from skilled intervention to address the above impairments. Patients rehabilitation potential is considered to be Good Factors which may influence rehabilitation potential include: []         None noted 
[]         Mental ability/status []         Medical condition 
[]         Home/family situation and support systems 
[]         Safety awareness [x]         Pain tolerance/management 
[]         Other: PLAN : 
Recommendations and Planned Interventions: 
[x]           Bed Mobility Training             [x]    Neuromuscular Re-Education 
[x]           Transfer Training                   []    Orthotic/Prosthetic Training 
[x]           Gait Training                         []    Modalities [x]           Therapeutic Exercises           []    Edema Management/Control 
[x]           Therapeutic Activities            [x]    Patient and Family Training/Education 
[]           Other (comment): Frequency/Duration: Patient will be followed by physical therapy  3 times a week to address goals. Discharge Recommendations: None Further Equipment Recommendations for Discharge: TBD SUBJECTIVE:  
Patient stated It hurts to put it down on the floor.  OBJECTIVE DATA SUMMARY:  
HISTORY:   
Past Medical History:  
Diagnosis Date  Back pain  Cough  Depression  GERD (gastroesophageal reflux disease)  Hemorrhoid  Irregular heart beat  SOB (shortness of breath)  Stress  Thromboembolus ([address]  Tiredness  Wheezing History reviewed. No pertinent surgical history. Prior Level of Function/Home Situation: independent Personal factors and/or comorbidities impacting plan of care:  
 
Home Situation Home Environment: Private residence # Steps to Enter: 4 Rails to Enter: Yes Hand Rails : Left One/Two Story Residence: One story Living Alone: No 
Support Systems: Family member(s) Patient Expects to be Discharged to[de-identified] Private residence Current DME Used/Available at Home: None EXAMINATION/PRESENTATION/DECISION MAKING: Critical Behavior: 
Neurologic State: Alert Orientation Level: Oriented X4 Cognition: Follows commands Hearing: Auditory Auditory Impairment: NoneSkin:   
Edema:  
Range Of Motion: 
AROM: Within functional limits PROM: Within functional limits Strength:   
Strength: Within functional limits Tone & Sensation:  
  
  
  
  
  
  
  
  
  
   
Coordination: 
  
Vision:  
  
Functional Mobility: 
Bed Mobility: 
  
Supine to Sit: Stand-by assistance; Additional time Transfers: 
Sit to Stand: Contact guard assistance Stand to Sit: Contact guard assistance Stand Pivot Transfers: Contact guard assistance Balance:  
Sitting: Intact Standing: Intact; With supportAmbulation/Gait Training:Distance (ft): 75 Feet (ft) Assistive Device: Gait belt;Walker, rolling Ambulation - Level of Assistance: Contact guard assistance Gait Abnormalities: Antalgic;Decreased step clearance; Step to gait Base of Support: Widened Stance: Left decreased Speed/Layla: Pace decreased (<100 feet/min) Step Length: Right shortened;Left shortened Stairs: Therapeutic Exercises:  
 
 
Functional Measure: 
Timed up and go: 
 
Timed Get Up And Go Test: 17 Timed Up and Go and G-code impairment scale: 
Percentage of Impairment CH 
 
0% 
 CI 
 
1-19% CJ 
 
20-39% CK 
 
40-59% CL 
 
60-79% CM 
 
80-99% CN  
 
100% Timed Score 0-56 10 11-12 13-14 15-16 17-18 19 20  
 
 
< than 10 seconds=Normal  Greater then 13.5 seconds (in elderly)=Increased fall risk Dimas GREENE Predicting the probability for falls in community dwelling older adults using the Timed Up and Go Test. Phys Ther. 2000;80:896-903. Pain: 
Pain Scale 1: Numeric (0 - 10) Pain Intensity 1: 6 Pain Location 1: Head 
Pain Orientation 1: Lateral 
Pain Description 1: Aching Pain Intervention(s) 1: Medication (see MAR) Activity Tolerance:  
Limited by pain Please refer to the flowsheet for vital signs taken during this treatment. After treatment: [x]         Patient left in no apparent distress sitting up in chair 
[]         Patient left in no apparent distress in bed 
[x]         Call bell left within reach [x]         Nursing notified 
[]         Caregiver present 
[]         Bed alarm activated COMMUNICATION/EDUCATION:  
The patients plan of care was discussed with: Registered Nurse. [x]         Fall prevention education was provided and the patient/caregiver indicated understanding. [x]         Patient/family have participated as able in goal setting and plan of care. [x]         Patient/family agree to work toward stated goals and plan of care. []         Patient understands intent and goals of therapy, but is neutral about his/her participation. []         Patient is unable to participate in goal setting and plan of care. Thank you for this referral. 
Niya Penaloza, PT Time Calculation: 18 mins

## 2018-11-25 NOTE — PROGRESS NOTES
Problem: Falls - Risk of 
Goal: *Absence of Falls Document Lenny Casper Fall Risk and appropriate interventions in the flowsheet. Outcome: Progressing Towards Goal 
Fall Risk Interventions: 
Mobility Interventions: Communicate number of staff needed for ambulation/transfer Medication Interventions: Evaluate medications/consider consulting pharmacy Elimination Interventions: Call light in reach

## 2018-11-25 NOTE — PROGRESS NOTES
Problem: Falls - Risk of 
Goal: *Absence of Falls Document Adolfo Hugo Fall Risk and appropriate interventions in the flowsheet. Outcome: Progressing Towards Goal 
Fall Risk Interventions: 
Mobility Interventions: Communicate number of staff needed for ambulation/transfer Medication Interventions: Evaluate medications/consider consulting pharmacy Elimination Interventions: Call light in reach

## 2018-11-25 NOTE — PROGRESS NOTES
Hospitalist Progress Note Elli Marino MD 
Answering service: [phone] OR [phone] from in house phone Cell: [phone] Date of Service:  2018 NAME:  Jose Schroeder :  1975 MRN:  [id number] Admission Summary: A 44-year-old white female with a past medical history of chronic back pain, depression, GERD, morbid obesity, hemorrhoids, irregular heartbeat, thromboembolus, unspecified, presented to the Emergency Department from home with a chief complaint of leg and thigh pain. Per the initial report, patient had reported left inner thigh pain, but now she more is concentrated more on pain in her left leg, which she notes is new onset, starting yesterday evening. The pain was reported to be a 10/10, aggravated with any touch, associated with redness and swelling. Being treated for cellulitis. Interval history / Subjective:  
 
Patient seen and examined at bedside. Discussed possible discharge with patient today. Patient stating she had difficulty yesterday on getting OOB to use the commode. PT consult pending. Assessment & Plan:  
 
Cellulitis, improved: bilateral thighs, perineal, and left lower extremity. 
-Wound Care consulted Severe sepsis: POA- 2nd to above 
-patient had been started on aztreonam 2 grams IV q8 hours, levofloxacin 750 mg IV q24 hours, and Vancomycin - ID changed to clindamycin and cefazolin. Likely transition to PO Keflex tomorrow and discharge - ID following, follow-up micro. NGTD  
- markedly improved on exam 
 
Tobacco abuse: Encouraged smoking cessation. -nicotine patch. Morbid obesity: Counseled on lifestyle modifications. Gastroesophageal reflux disease: Continue omeprazole. Code status: FULL 
DVT prophylaxis: lovenox Care Plan discussed with: Patient/Family and Nurse Disposition: PT consult pending Hospital Problems  Date Reviewed: 2018 Codes Class Noted POA Cellulitis ICD-10-CM: L03.90 ICD-9-CM: 682.9  11/20/2018 Unknown Leukocytosis ICD-10-CM: O44.214 ICD-9-CM: 288.60  11/20/2018 Unknown Tachycardia ICD-10-CM: R00.0 ICD-9-CM: 785.0  11/20/2018 Unknown * (Principal) SIRS (systemic inflammatory response syndrome) (HCC) ICD-10-CM: R65.10 ICD-9-CM: 995.90  11/20/2018 Unknown Review of Systems: A comprehensive review of systems was negative except for that written in the HPI. Vital Signs:  
 Last 24hrs VS reviewed since prior progress note. Most recent are: 
Visit Vitals /70 (BP 1 Location: Right arm, BP Patient Position: At rest) Pulse 96 Temp 99.7 °F (37.6 °C) Resp 16 Ht 5' 2\" (1.575 m) Wt (!) 162.1 kg (357 lb 6.4 oz) SpO2 90% BMI 65.37 kg/m² Intake/Output Summary (Last 24 hours) at 11/25/2018 1034 Last data filed at 11/25/2018 4269 Gross per 24 hour Intake  Output 1000 ml Net -1000 ml Physical Examination:  
 
  
Constitutional:  No acute distress, cooperative ENT:  Neck supple Resp:  CTA bilaterally. No accessory muscle use CV:  Regular rhythm, normal rate, no murmur GI:  Soft, non distended, non tender, normoactive bowel sounds Musculoskeletal:  LLE with redness - significantly improved Neurologic:  Moves all extremities. AAOx3 Data Review:  
 Review and/or order of clinical lab test 
Review and/or order of tests in the radiology section of CPT Review and/or order of tests in the medicine section of CPT Labs:  
 
Recent Labs  
  11/25/18 0335 11/24/18 
0234 WBC 9.7 8.3 HGB 9.4* 9.4* HCT 29.1* 29.3*  
 195 Recent Labs  
  11/25/18 
0335 11/24/18 
0234  139  
K 3.5 3.5  103 CO2 29 30 BUN 7 7 CREA 0.59 0.62 * 97  
CA 8.2* 7.8* No results for input(s): SGOT, GPT, ALT, AP, TBIL, TBILI, TP, ALB, GLOB, GGT, AML, LPSE in the last 72 hours. No lab exists for component: AMYP, HLPSE No results for input(s): INR, PTP, APTT in the last 72 hours. No lab exists for component: INREXT, INREXT No results for input(s): FE, TIBC, PSAT, FERR in the last 72 hours. No results found for: FOL, RBCF No results for input(s): PH, PCO2, PO2 in the last 72 hours. No results for input(s): CPK, CKNDX, TROIQ in the last 72 hours. No lab exists for component: CPKMB Lab Results Component Value Date/Time Cholesterol, total 197 01/24/2018 11:27 AM  
 HDL Cholesterol 36 (L) 01/24/2018 11:27 AM  
 LDL, calculated 144 (H) 01/24/2018 11:27 AM  
 Triglyceride 84 01/24/2018 11:27 AM  
 
Lab Results Component Value Date/Time Glucose (POC) 125 (H) 11/21/2018 04:41 PM  
 Glucose (POC) 129 (H) 11/21/2018 11:33 AM  
 
Lab Results Component Value Date/Time Color DARK YELLOW 11/20/2018 01:10 AM  
 Appearance CLOUDY (A) 11/20/2018 01:10 AM  
 Specific gravity 1.024 11/20/2018 01:10 AM  
 Specific gravity <1.005 08/21/2018 08:59 PM  
 pH (UA) 5.5 11/20/2018 01:10 AM  
 Protein NEGATIVE  11/20/2018 01:10 AM  
 Glucose NEGATIVE  11/20/2018 01:10 AM  
 Ketone NEGATIVE  11/20/2018 01:10 AM  
 Bilirubin NEGATIVE  11/20/2018 01:10 AM  
 Urobilinogen 1.0 11/20/2018 01:10 AM  
 Nitrites NEGATIVE  11/20/2018 01:10 AM  
 Leukocyte Esterase TRACE (A) 11/20/2018 01:10 AM  
 Epithelial cells FEW 11/20/2018 01:10 AM  
 Bacteria NEGATIVE  11/20/2018 01:10 AM  
 WBC 0-4 11/20/2018 01:10 AM  
 RBC >100 (H) 11/20/2018 01:10 AM  
 
 
 
Medications Reviewed:  
 
Current Facility-Administered Medications Medication Dose Route Frequency  alteplase (CATHFLO) 1 mg in sterile water (preservative free) 1 mL injection  1 mg InterCATHeter PRN  
 diphenhydrAMINE (BENADRYL) capsule 25 mg  25 mg Oral Q6H PRN  
 oxyCODONE IR (ROXICODONE) tablet 5 mg  5 mg Oral Q6H PRN  
 clindamycin (CLEOCIN) 900mg D5W 50mL IVPB (premix)  900 mg IntraVENous Q8H  
  ceFAZolin (ANCEF) 2 g/20 mL in sterile water IV syringe  2 g IntraVENous Q8H  
 SALINE PERIPHERAL FLUSH Q8H soln 10-40 mL  10-40 mL InterCATHeter Q8H  
 ondansetron (ZOFRAN) injection 4 mg  4 mg IntraVENous Q4H PRN  pantoprazole (PROTONIX) tablet 40 mg  40 mg Oral DAILY  sodium chloride (NS) flush 5-10 mL  5-10 mL IntraVENous Q8H  
 sodium chloride (NS) flush 5-10 mL  5-10 mL IntraVENous PRN  
 enoxaparin (LOVENOX) injection 40 mg  40 mg SubCUTAneous Q24H  
 nicotine (NICODERM CQ) 21 mg/24 hr patch 1 Patch  1 Patch TransDERmal DAILY  acetaminophen (TYLENOL) tablet 650 mg  650 mg Oral Q6H PRN  
 
______________________________________________________________________ EXPECTED LENGTH OF STAY: 3d 16h ACTUAL LENGTH OF STAY:          5 Sarahi Jacob MD

## 2018-11-25 NOTE — PROGRESS NOTES
Primary Nurse Nely Rodríguez RN and Willa Meyer RN performed a dual skin assessment on this patient Impairment noted- see wound doc flow sheet Garret score is 17 Crusted skin on LL foot. Red, inflamed, blistered LLE.

## 2018-11-26 PROCEDURE — 74011250637 HC RX REV CODE- 250/637: Performed by: HOSPITALIST

## 2018-11-26 PROCEDURE — 87077 CULTURE AEROBIC IDENTIFY: CPT

## 2018-11-26 PROCEDURE — 97530 THERAPEUTIC ACTIVITIES: CPT

## 2018-11-26 PROCEDURE — 74011250636 HC RX REV CODE- 250/636: Performed by: INTERNAL MEDICINE

## 2018-11-26 PROCEDURE — 87147 CULTURE TYPE IMMUNOLOGIC: CPT

## 2018-11-26 PROCEDURE — 97116 GAIT TRAINING THERAPY: CPT

## 2018-11-26 PROCEDURE — 87070 CULTURE OTHR SPECIMN AEROBIC: CPT

## 2018-11-26 PROCEDURE — 87186 SC STD MICRODIL/AGAR DIL: CPT

## 2018-11-26 PROCEDURE — 74011000250 HC RX REV CODE- 250: Performed by: INTERNAL MEDICINE

## 2018-11-26 PROCEDURE — 74011250637 HC RX REV CODE- 250/637: Performed by: INTERNAL MEDICINE

## 2018-11-26 PROCEDURE — 74011250636 HC RX REV CODE- 250/636: Performed by: FAMILY MEDICINE

## 2018-11-26 PROCEDURE — 74011250637 HC RX REV CODE- 250/637: Performed by: FAMILY MEDICINE

## 2018-11-26 PROCEDURE — 65270000032 HC RM SEMIPRIVATE

## 2018-11-26 RX ORDER — BUTALBITAL, ACETAMINOPHEN AND CAFFEINE 50; 325; 40 MG/1; MG/1; MG/1
1 TABLET ORAL ONCE
Status: COMPLETED | OUTPATIENT
Start: 2018-11-26 | End: 2018-11-26

## 2018-11-26 RX ORDER — FACIAL-BODY WIPES
10 EACH TOPICAL DAILY PRN
Status: DISCONTINUED | OUTPATIENT
Start: 2018-11-26 | End: 2018-11-28 | Stop reason: HOSPADM

## 2018-11-26 RX ORDER — AMOXICILLIN 250 MG
1 CAPSULE ORAL DAILY
Status: DISCONTINUED | OUTPATIENT
Start: 2018-11-26 | End: 2018-11-28 | Stop reason: HOSPADM

## 2018-11-26 RX ADMIN — Medication 2 G: at 10:45

## 2018-11-26 RX ADMIN — OXYCODONE HYDROCHLORIDE 5 MG: 5 TABLET ORAL at 20:23

## 2018-11-26 RX ADMIN — OXYCODONE HYDROCHLORIDE 5 MG: 5 TABLET ORAL at 14:28

## 2018-11-26 RX ADMIN — ACETAMINOPHEN 650 MG: 325 TABLET ORAL at 01:33

## 2018-11-26 RX ADMIN — Medication 10 ML: at 06:10

## 2018-11-26 RX ADMIN — Medication 10 ML: at 15:15

## 2018-11-26 RX ADMIN — BISACODYL 10 MG: 10 SUPPOSITORY RECTAL at 15:15

## 2018-11-26 RX ADMIN — Medication 2 G: at 18:59

## 2018-11-26 RX ADMIN — CLINDAMYCIN PHOSPHATE 900 MG: 900 INJECTION, SOLUTION INTRAVENOUS at 18:56

## 2018-11-26 RX ADMIN — CLINDAMYCIN PHOSPHATE 900 MG: 900 INJECTION, SOLUTION INTRAVENOUS at 02:28

## 2018-11-26 RX ADMIN — BUTALBITAL, ACETAMINOPHEN AND CAFFEINE 1 TABLET: 50; 325; 40 TABLET ORAL at 10:43

## 2018-11-26 RX ADMIN — PANTOPRAZOLE SODIUM 40 MG: 40 TABLET, DELAYED RELEASE ORAL at 08:21

## 2018-11-26 RX ADMIN — SENNOSIDES AND DOCUSATE SODIUM 1 TABLET: 8.6; 5 TABLET ORAL at 14:28

## 2018-11-26 RX ADMIN — ENOXAPARIN SODIUM 40 MG: 40 INJECTION SUBCUTANEOUS at 06:09

## 2018-11-26 RX ADMIN — Medication 2 G: at 02:27

## 2018-11-26 RX ADMIN — OXYCODONE HYDROCHLORIDE 5 MG: 5 TABLET ORAL at 08:23

## 2018-11-26 RX ADMIN — CLINDAMYCIN PHOSPHATE 900 MG: 900 INJECTION, SOLUTION INTRAVENOUS at 10:44

## 2018-11-26 RX ADMIN — POLYETHYLENE GLYCOL 3350 17 G: 17 POWDER, FOR SOLUTION ORAL at 08:21

## 2018-11-26 NOTE — PROGRESS NOTES
Problem: Mobility Impaired (Adult and Pediatric) Goal: *Acute Goals and Plan of Care (Insert Text) Physical Therapy Goals Initiated 11/25/2018 1. Patient will move from supine to sit and sit to supine , scoot up and down and roll side to side in bed with independence within 7 day(s). 2.  Patient will transfer from bed to chair and chair to bed with modified independence using the least restrictive device within 7 day(s). 3.  Patient will perform sit to stand with modified independence within 7 day(s). 4.  Patient will ambulate with modified independence for 150 feet with the least restrictive device within 7 day(s). 5.  Patient will ascend/descend 4 stairs with 1 handrail(s) with modified independence within 7 day(s). physical Therapy TREATMENT Patient: Alexis Briggs (87 y.o. female) Date: 11/26/2018 Diagnosis: SIRS (systemic inflammatory response syndrome) (HCC) Tachycardia Leukocytosis Cellulitis SIRS (systemic inflammatory response syndrome) (HCC) Procedure(s) (LRB): 
INFUSION CATHETER INSERTION (Bilateral) 5 Days Post-Op Precautions:   
Chart, physical therapy assessment, plan of care and goals were reviewed. ASSESSMENT: 
Pt cleared by RN and received up in chair. LLE remains swollen, red and hot to the touch. Reports pain better controlled today and able to tolerate light touch. Donned socks with total A prior to ambulation. Pt mobilizing well given extent of edema. Pt reporting increased pain d/t a swollen lymph node that was rubbing the opposite leg while walking. Pt utilized a standard walker today and required verbal cueing for safe use and keeping walker in close proximity. Would benefit from RW in place of standard walker next session however also hopeful to progress of walker. At this time would need a RW if discharged home. Left up in chair with cool compress applied to leg and elevated. Progression toward goals: 
[x]    Improving appropriately and progressing toward goals []    Improving slowly and progressing toward goals 
[]    Not making progress toward goals and plan of care will be adjusted PLAN: 
Patient continues to benefit from skilled intervention to address the above impairments. Continue treatment per established plan of care. Discharge Recommendations:  None Further Equipment Recommendations for Discharge:  rolling walker SUBJECTIVE:  
Patient stated Lakia Egan had another walker in here but someone came and took it out.  OBJECTIVE DATA SUMMARY:  
Critical Behavior: 
Neurologic State: Alert Orientation Level: Oriented X4 Cognition: Follows commands, Appropriate for age attention/concentration Functional Mobility Training: 
Bed Mobility: 
  
  
  
  
  
  
Transfers: 
Sit to Stand: Stand-by assistance Stand to Sit: Stand-by assistance Balance: 
Sitting: Intact Standing: Intact; With supportAmbulation/Gait Training: 
Distance (ft): 100 Feet (ft) Assistive Device: Gait belt;Walker Ambulation - Level of Assistance: Contact guard assistance Gait Abnormalities: Antalgic Base of Support: Widened Stance: Left decreased Speed/Layla: Pace decreased (<100 feet/min) Step Length: Right shortened;Left shortened Stairs: 
  
  
   
 
Neuro Re-Education: 
 
Therapeutic Exercises:  
 
Pain: 
Pain Scale 1: Numeric (0 - 10) Pain Intensity 1: 4 Pain Location 1: Head 
Pain Orientation 1: Right;Left Pain Description 1: Shooting Pain Intervention(s) 1: Medication (see MAR) Activity Tolerance:  
Good Please refer to the flowsheet for vital signs taken during this treatment. After treatment:  
[x]    Patient left in no apparent distress sitting up in chair 
[]    Patient left in no apparent distress in bed 
[x]    Call bell left within reach [x]    Nursing notified 
[]    Caregiver present 
[]    Bed alarm activated COMMUNICATION/COLLABORATION:  
 The patients plan of care was discussed with: Registered Nurse Kayce Cain, PT Time Calculation: 25 mins

## 2018-11-26 NOTE — PROGRESS NOTES
Bedside shift change report given to Thomasina Angelucci (oncoming nurse) by Finesse Borges (offgoing nurse). Report included the following information SBAR, Kardex, MAR and Recent Results.

## 2018-11-26 NOTE — PROGRESS NOTES
Bedside and Verbal shift change report given to Fabio Salmeron RN (oncoming nurse) by Beto Boothe RN (offgoing nurse). Report included the following information SBAR, Kardex, MAR and Recent Results.

## 2018-11-26 NOTE — PROGRESS NOTES
Chart reviewed. The patient was discussed in [facility] rounds. The patient is on IV abx (self pay) and wound care consult noted. CRM will continue to follow for transitions of care.  EARL

## 2018-11-26 NOTE — WOUND CARE
WOCN Note:  
  
Reconsult for left leg cellulitis. 
  
Chart reviewed. Admitted DX:  Left low leg cellulitis.  Patient reports that she was scratching an area on her low leg that has psoriasis. Past Medical History:  chronic back pain, depression, GERD, morbid obesity, hemorrhoids, irregular heartbeat, thromboembolus. 
  
Assessment:  
Patient is A&O x 3, communicative, continent and sitting up in recliner. Bed: Bariatric plus Patient reports no pain. Heels intact without erythema. Edema and improved warm red erythema to left lower leg. Dry patch of skin to the anterior left ankle consistent with psoriasis. No open wounds. No exudate. 
  
Skin Care & Pressure Prevention: 
Apply Aloe Vesta to dry patch on left ankle. Minimize layers of linen/pads under patient to optimize support surface.   
Turn/reposition approximately every 2 hours and offload heels. Specialty bed: Bariatric ordered via [employer]. Use only flat sheet and one incontinence pad. Please call [facility] if not delivered.  REF# [id number]. 
  
Discussed above plan with patient & RN. 
  
Transition of Care: Plan to follow weekly and as needed while admitted to hospital. 
  
CLARISSE Babcock RN [facility] Wound Care Office [phone] Pager [phone]

## 2018-11-26 NOTE — PROGRESS NOTES
Problem: Falls - Risk of 
Goal: *Absence of Falls Document [place] Hany Fall Risk and appropriate interventions in the flowsheet. Outcome: Progressing Towards Goal 
Fall Risk Interventions: 
Mobility Interventions: Communicate number of staff needed for ambulation/transfer, PT Consult for mobility concerns, Patient to call before getting OOB Medication Interventions: Patient to call before getting OOB Elimination Interventions: Call light in reach, Patient to call for help with toileting needs

## 2018-11-26 NOTE — PROGRESS NOTES
Ivelisse Miller is a 79-year-old white female with a history of bilateral lymphedema of the legs  Eczema ,depression morbid obesity ,back pain presented to the emergency room on 11/20 with pain in her left leg. Patient states on Monday the 19th she noted pain and swelling of the left leg. She also saw redness of the left leg. As it was getting worse she came to the emergency department. She has never had a similar issue in the past.  She states she has eczema and has patches on her legs and as it was itchy her  was scratching it for her as she could not reach the leg. She also had fever and chills. She denies any other trauma. She is out of work for the last 2 months. She is a smoker and smokes a pack a day. In the emergency room her temperature was 102. Her WBC count was 12.6. Blood cultures were sent and she was started on vancomycin and Levaquin. She has history of penicillin allergy. She states she got hives after taking penicillin when she was a child. Marie Baltazar Says the leg is less painful Temp of 102 was documented at 1am this morning but she did not have any fever Objective: VITALS:   
Patient Vitals for the past 12 hrs: 
 Temp Pulse Resp BP SpO2  
11/26/18 0815 98.7 °F (37.1 °C) 83 16 126/70 96 % 11/26/18 0223 99.9 °F (37.7 °C) 83 18 122/68 94 % PHYSICAL EXAM:  
General:    Alert, cooperative, no distress, appears stated age. Morbid obesity Head:   Normocephalic, without obvious abnormality, atraumatic. Eyes:   Conjunctivae clear, anicteric sclerae. Pupils are equal 
ENT  Nares normal. No drainage or sinus tenderness. Lips, mucosa, and tongue normal.  No Thrush Neck:  Supple, symmetrical,  no adenopathy, thyroid: non tender 
  no carotid bruit and no JVD. Back:    No CVA tenderness. Lungs:   Clear to auscultation bilaterally. No Wheezing or Rhonchi. No rales. Heart:   Regular rate and rhythm,  no murmur, rub or gallop. Abdomen:   Soft, non-tender,not distended. Bowel sounds normal. No masses Extremities: Bilateral lymphedema of the legs. left leg below the knee erythematous tender to touch. No necrosis or bulla. A patch of dry skin over the ankle. Skin:     Patch of dry skin over the elbows Lymph: Cervical, supraclavicular normal. 
Neurologic: Grossly non-focal 
 
Pertinent Labs WBC 12.1 Hb 9.9  CR 0.68 Blood culture no growth so far IMAGING RESULTS: 
Doppler: No evidence of DVT both legs Impression/Recommendation 82-year-old white female with history of lymphedema legs and eczema presents with fever swelling and erythema with tenderness of the left leg. Cellulitis of the left leg. Common organisms are group A streptococcus and Staphylococcus. Entry point likely the dry patchy eczematous skin on the front of ankle joint. Unlikely to be MRSA. Seh is on cefazolin + clindamycin- not much of an improvement because of the underlying lymphedema She will benefit from compression bandage . Wound care to see her Smoker. One pack per day. Chronic cough because of that. She states she does not need any nicotine patches that she does not have any craving. Counseled about stopping smoking. Discussed with patient.

## 2018-11-26 NOTE — PROGRESS NOTES
Hospitalist Progress Note Ron Dodge MD 
Answering service: [phone] OR [phone] from in house phone Cell: [phone] Date of Service:  2018 NAME:  Cary Ventura :  1975 MRN:  [id number] Admission Summary: A 72-year-old white female with a past medical history of chronic back pain, depression, GERD, morbid obesity, hemorrhoids, irregular heartbeat, thromboembolus, unspecified, presented to the Emergency Department from home with a chief complaint of leg and thigh pain. Per the initial report, patient had reported left inner thigh pain, but now she more is concentrated more on pain in her left leg, which she notes is new onset, starting yesterday evening. The pain was reported to be a 10/10, aggravated with any touch, associated with redness and swelling. Being treated for cellulitis. Interval history / Subjective:  
 
Patient seen and examined at bedside. LE redness had initially improved on current abx regimen, but now with activity has worsened again. ID to see today. Patient has not had BM since admission. Patient denying abdominal pain or worsened distension. Discussed with nursing. Assessment & Plan:  
 
Cellulitis, improved: bilateral thighs, perineal, and left lower extremity. 
-Wound Care consulted Severe sepsis: POA- 2nd to above 
-patient had been started on aztreonam 2 grams IV q8 hours, levofloxacin 750 mg IV q24 hours, and Vancomycin - ID changed to clindamycin and cefazolin. Likely transition to PO ?tomorrow and discharge - ID following, follow-up micro. NGTD  
- redness worse on exam today Tobacco abuse: Encouraged smoking cessation. -nicotine patch Morbid obesity: Counseled on lifestyle modifications. Gastroesophageal reflux disease: Continue omeprazole. Constipation: Add suppository PRN and kelby-colace to bowel regimen.  
 
Code status: FULL 
 DVT prophylaxis: lovenox Care Plan discussed with: Patient/Family and Nurse Disposition: home ?tomorrow on PO abx Hospital Problems  Date Reviewed: 11/20/2018 Codes Class Noted POA Cellulitis ICD-10-CM: L03.90 ICD-9-CM: 682.9  11/20/2018 Unknown Leukocytosis ICD-10-CM: Q66.781 ICD-9-CM: 288.60  11/20/2018 Unknown Tachycardia ICD-10-CM: R00.0 ICD-9-CM: 785.0  11/20/2018 Unknown * (Principal) SIRS (systemic inflammatory response syndrome) (HCC) ICD-10-CM: R65.10 ICD-9-CM: 995.90  11/20/2018 Unknown Review of Systems: A comprehensive review of systems was negative except for that written in the HPI. Vital Signs:  
 Last 24hrs VS reviewed since prior progress note. Most recent are: 
Visit Vitals /68 (BP 1 Location: Right arm, BP Patient Position: At rest) Pulse 83 Temp 99.9 °F (37.7 °C) Resp 18 Ht 5' 2\" (1.575 m) Wt (!) 162.1 kg (357 lb 6.4 oz) SpO2 94% BMI 65.37 kg/m² Intake/Output Summary (Last 24 hours) at 11/26/2018 2075 Last data filed at 11/25/2018 1242 Gross per 24 hour Intake  Output 1100 ml Net -1100 ml Physical Examination:  
 
  
Constitutional:  No acute distress, cooperative ENT:  Neck supple Resp:  CTA bilaterally. No accessory muscle use CV:  Regular rhythm, normal rate, no murmur GI:  Soft, non distended, non tender, normoactive bowel sounds Musculoskeletal:  LLE with redness - significantly improved Neurologic:  Moves all extremities. AAOx3 Data Review:  
 Review and/or order of clinical lab test 
Review and/or order of tests in the radiology section of CPT Review and/or order of tests in the medicine section of CPT Labs:  
 
Recent Labs  
  11/25/18 0335 11/24/18 
0234 WBC 9.7 8.3 HGB 9.4* 9.4* HCT 29.1* 29.3*  
 195 Recent Labs  
  11/25/18 0335 11/24/18 
0234  139  
K 3.5 3.5  103 CO2 29 30 BUN 7 7 CREA 0.59 0.62  
 * 97  
CA 8.2* 7.8* No results for input(s): SGOT, GPT, ALT, AP, TBIL, TBILI, TP, ALB, GLOB, GGT, AML, LPSE in the last 72 hours. No lab exists for component: AMYP, HLPSE No results for input(s): INR, PTP, APTT in the last 72 hours. No lab exists for component: INREXT, INREXT No results for input(s): FE, TIBC, PSAT, FERR in the last 72 hours. No results found for: FOL, RBCF No results for input(s): PH, PCO2, PO2 in the last 72 hours. No results for input(s): CPK, CKNDX, TROIQ in the last 72 hours. No lab exists for component: CPKMB Lab Results Component Value Date/Time Cholesterol, total 197 01/24/2018 11:27 AM  
 HDL Cholesterol 36 (L) 01/24/2018 11:27 AM  
 LDL, calculated 144 (H) 01/24/2018 11:27 AM  
 Triglyceride 84 01/24/2018 11:27 AM  
 
Lab Results Component Value Date/Time Glucose (POC) 125 (H) 11/21/2018 04:41 PM  
 Glucose (POC) 129 (H) 11/21/2018 11:33 AM  
 
Lab Results Component Value Date/Time Color DARK YELLOW 11/20/2018 01:10 AM  
 Appearance CLOUDY (A) 11/20/2018 01:10 AM  
 Specific gravity 1.024 11/20/2018 01:10 AM  
 Specific gravity <1.005 08/21/2018 08:59 PM  
 pH (UA) 5.5 11/20/2018 01:10 AM  
 Protein NEGATIVE  11/20/2018 01:10 AM  
 Glucose NEGATIVE  11/20/2018 01:10 AM  
 Ketone NEGATIVE  11/20/2018 01:10 AM  
 Bilirubin NEGATIVE  11/20/2018 01:10 AM  
 Urobilinogen 1.0 11/20/2018 01:10 AM  
 Nitrites NEGATIVE  11/20/2018 01:10 AM  
 Leukocyte Esterase TRACE (A) 11/20/2018 01:10 AM  
 Epithelial cells FEW 11/20/2018 01:10 AM  
 Bacteria NEGATIVE  11/20/2018 01:10 AM  
 WBC 0-4 11/20/2018 01:10 AM  
 RBC >100 (H) 11/20/2018 01:10 AM  
 
 
 
Medications Reviewed:  
 
Current Facility-Administered Medications Medication Dose Route Frequency  alteplase (CATHFLO) 1 mg in sterile water (preservative free) 1 mL injection  1 mg InterCATHeter PRN  polyethylene glycol (MIRALAX) packet 17 g  17 g Oral DAILY  diphenhydrAMINE (BENADRYL) capsule 25 mg  25 mg Oral Q6H PRN  
 oxyCODONE IR (ROXICODONE) tablet 5 mg  5 mg Oral Q6H PRN  
 clindamycin (CLEOCIN) 900mg D5W 50mL IVPB (premix)  900 mg IntraVENous Q8H  
 ceFAZolin (ANCEF) 2 g/20 mL in sterile water IV syringe  2 g IntraVENous Q8H  
 SALINE PERIPHERAL FLUSH Q8H soln 10-40 mL  10-40 mL InterCATHeter Q8H  
 ondansetron (ZOFRAN) injection 4 mg  4 mg IntraVENous Q4H PRN  pantoprazole (PROTONIX) tablet 40 mg  40 mg Oral DAILY  sodium chloride (NS) flush 5-10 mL  5-10 mL IntraVENous Q8H  
 sodium chloride (NS) flush 5-10 mL  5-10 mL IntraVENous PRN  
 enoxaparin (LOVENOX) injection 40 mg  40 mg SubCUTAneous Q24H  
 nicotine (NICODERM CQ) 21 mg/24 hr patch 1 Patch  1 Patch TransDERmal DAILY  acetaminophen (TYLENOL) tablet 650 mg  650 mg Oral Q6H PRN  
 
______________________________________________________________________ EXPECTED LENGTH OF STAY: 3d 16h ACTUAL LENGTH OF STAY:          6 Katarina Huff MD

## 2018-11-27 LAB
ANION GAP SERPL CALC-SCNC: 8 MMOL/L (ref 5–15)
BASOPHILS # BLD: 0 K/UL (ref 0–0.1)
BASOPHILS NFR BLD: 0 % (ref 0–1)
BUN SERPL-MCNC: 8 MG/DL (ref 6–20)
BUN/CREAT SERPL: 11 (ref 12–20)
CALCIUM SERPL-MCNC: 7.7 MG/DL (ref 8.5–10.1)
CHLORIDE SERPL-SCNC: 100 MMOL/L (ref 97–108)
CO2 SERPL-SCNC: 28 MMOL/L (ref 21–32)
CREAT SERPL-MCNC: 0.7 MG/DL (ref 0.55–1.02)
DIFFERENTIAL METHOD BLD: ABNORMAL
EOSINOPHIL # BLD: 0.1 K/UL (ref 0–0.4)
EOSINOPHIL NFR BLD: 2 % (ref 0–7)
ERYTHROCYTE [DISTWIDTH] IN BLOOD BY AUTOMATED COUNT: 12.7 % (ref 11.5–14.5)
GLUCOSE SERPL-MCNC: 126 MG/DL (ref 65–100)
HCT VFR BLD AUTO: 29.7 % (ref 35–47)
HGB BLD-MCNC: 9.5 G/DL (ref 11.5–16)
IMM GRANULOCYTES # BLD: 0 K/UL
IMM GRANULOCYTES NFR BLD AUTO: 0 %
LYMPHOCYTES # BLD: 0.9 K/UL (ref 0.8–3.5)
LYMPHOCYTES NFR BLD: 14 % (ref 12–49)
MCH RBC QN AUTO: 30.8 PG (ref 26–34)
MCHC RBC AUTO-ENTMCNC: 32 G/DL (ref 30–36.5)
MCV RBC AUTO: 96.4 FL (ref 80–99)
METAMYELOCYTES NFR BLD MANUAL: 2 %
MONOCYTES # BLD: 0.4 K/UL (ref 0–1)
MONOCYTES NFR BLD: 6 % (ref 5–13)
MYELOCYTES NFR BLD MANUAL: 2 %
NEUTS BAND NFR BLD MANUAL: 4 % (ref 0–6)
NEUTS SEG # BLD: 4.9 K/UL (ref 1.8–8)
NEUTS SEG NFR BLD: 70 % (ref 32–75)
NRBC # BLD: 0 K/UL (ref 0–0.01)
NRBC BLD-RTO: 0 PER 100 WBC
PLATELET # BLD AUTO: 262 K/UL (ref 150–400)
PLATELET COMMENTS,PCOM: ABNORMAL
PMV BLD AUTO: 10.4 FL (ref 8.9–12.9)
POTASSIUM SERPL-SCNC: 3.5 MMOL/L (ref 3.5–5.1)
RBC # BLD AUTO: 3.08 M/UL (ref 3.8–5.2)
RBC MORPH BLD: ABNORMAL
SODIUM SERPL-SCNC: 136 MMOL/L (ref 136–145)
WBC # BLD AUTO: 6.6 K/UL (ref 3.6–11)

## 2018-11-27 PROCEDURE — 74011250637 HC RX REV CODE- 250/637: Performed by: FAMILY MEDICINE

## 2018-11-27 PROCEDURE — 74011250636 HC RX REV CODE- 250/636: Performed by: INTERNAL MEDICINE

## 2018-11-27 PROCEDURE — 36415 COLL VENOUS BLD VENIPUNCTURE: CPT

## 2018-11-27 PROCEDURE — 85025 COMPLETE CBC W/AUTO DIFF WBC: CPT

## 2018-11-27 PROCEDURE — 94760 N-INVAS EAR/PLS OXIMETRY 1: CPT

## 2018-11-27 PROCEDURE — 74011250637 HC RX REV CODE- 250/637: Performed by: HOSPITALIST

## 2018-11-27 PROCEDURE — 80048 BASIC METABOLIC PNL TOTAL CA: CPT

## 2018-11-27 PROCEDURE — 65270000032 HC RM SEMIPRIVATE

## 2018-11-27 PROCEDURE — 74011250636 HC RX REV CODE- 250/636: Performed by: FAMILY MEDICINE

## 2018-11-27 PROCEDURE — 74011250637 HC RX REV CODE- 250/637: Performed by: INTERNAL MEDICINE

## 2018-11-27 PROCEDURE — 97116 GAIT TRAINING THERAPY: CPT

## 2018-11-27 PROCEDURE — 74011000250 HC RX REV CODE- 250: Performed by: INTERNAL MEDICINE

## 2018-11-27 RX ORDER — ACETAMINOPHEN 325 MG/1
650 TABLET ORAL
Status: DISCONTINUED | OUTPATIENT
Start: 2018-11-27 | End: 2018-11-28 | Stop reason: HOSPADM

## 2018-11-27 RX ADMIN — ACETAMINOPHEN 650 MG: 325 TABLET ORAL at 13:49

## 2018-11-27 RX ADMIN — SENNOSIDES AND DOCUSATE SODIUM 1 TABLET: 8.6; 5 TABLET ORAL at 09:22

## 2018-11-27 RX ADMIN — PANTOPRAZOLE SODIUM 40 MG: 40 TABLET, DELAYED RELEASE ORAL at 09:22

## 2018-11-27 RX ADMIN — OXYCODONE HYDROCHLORIDE 5 MG: 5 TABLET ORAL at 19:02

## 2018-11-27 RX ADMIN — Medication 2 G: at 02:36

## 2018-11-27 RX ADMIN — CLINDAMYCIN PHOSPHATE 900 MG: 900 INJECTION, SOLUTION INTRAVENOUS at 10:35

## 2018-11-27 RX ADMIN — ENOXAPARIN SODIUM 40 MG: 40 INJECTION SUBCUTANEOUS at 09:21

## 2018-11-27 RX ADMIN — OXYCODONE HYDROCHLORIDE 5 MG: 5 TABLET ORAL at 02:36

## 2018-11-27 RX ADMIN — Medication 10 ML: at 13:49

## 2018-11-27 RX ADMIN — Medication 2 G: at 18:56

## 2018-11-27 RX ADMIN — Medication 2 G: at 10:35

## 2018-11-27 RX ADMIN — CLINDAMYCIN PHOSPHATE 900 MG: 900 INJECTION, SOLUTION INTRAVENOUS at 02:35

## 2018-11-27 RX ADMIN — POLYETHYLENE GLYCOL 3350 17 G: 17 POWDER, FOR SOLUTION ORAL at 09:22

## 2018-11-27 NOTE — PROGRESS NOTES
11/27/18; 09:45 -  
CM participated in IDRs on patient and performed independent chart review. Patient lives with spouse and 2 sons. Per PT, patient has no discharge disposition recommendations for transitions of care. ID is following, and wound care has been consulted. Patient is to start compression bandaging of legs for lymphedema. Patient may discharge home tomorrow, 11/28. CRM: Nasir Walters, MPH, [address] [place] [address]; Z: [phone]

## 2018-11-27 NOTE — PROGRESS NOTES
Hospitalist Progress Note Maritza Johnson MD 
Answering service: [phone] OR [phone] from in house phone Cell: [phone] Date of Service:  2018 NAME:  Camilo Logan :  1975 MRN:  [id number] Admission Summary: A 68-year-old white female with a past medical history of chronic back pain, depression, GERD, morbid obesity, hemorrhoids, irregular heartbeat, thromboembolus, unspecified, presented to the Emergency Department from home with a chief complaint of leg and thigh pain. Per the initial report, patient had reported left inner thigh pain, but now she more is concentrated more on pain in her left leg, which she notes is new onset, starting yesterday evening. The pain was reported to be a 10/10, aggravated with any touch, associated with redness and swelling. Being treated for cellulitis. Interval history / Subjective:  
 
Patient seen and examined at bedside. Just started compression today. No complaints or issues overnight. Assessment & Plan:  
 
Cellulitis, improved: bilateral thighs, perineal, and left lower extremity. 
-Wound Care - Compression x 24 hours Severe sepsis: POA- 2nd to above 
-C/w clindamycin and cefazolin. Likely transition to PO tomorrow and discharge - ID following, follow-up micro. NGTD  
- redness worse on exam today Tobacco abuse: Encouraged smoking cessation. -nicotine patch Morbid obesity: Counseled on lifestyle modifications. Gastroesophageal reflux disease: Continue omeprazole. Constipation: Add suppository PRN and kelby-colace to bowel regimen. Code status: FULL 
DVT prophylaxis: lovenox Care Plan discussed with: Patient/Family and Nurse Disposition: home ?tomorrow on PO abx Hospital Problems  Date Reviewed: 2018 Codes Class Noted POA Cellulitis ICD-10-CM: L03.90 ICD-9-CM: 682.9  2018 Unknown Leukocytosis ICD-10-CM: K14.243 ICD-9-CM: 288.60  11/20/2018 Unknown Tachycardia ICD-10-CM: R00.0 ICD-9-CM: 785.0  11/20/2018 Unknown * (Principal) SIRS (systemic inflammatory response syndrome) (HCC) ICD-10-CM: R65.10 ICD-9-CM: 995.90  11/20/2018 Unknown Review of Systems: A comprehensive review of systems was negative except for that written in the HPI. Vital Signs:  
 Last 24hrs VS reviewed since prior progress note. Most recent are: 
Visit Vitals /85 (BP 1 Location: Left arm, BP Patient Position: At rest) Pulse 80 Temp 98.9 °F (37.2 °C) Resp 16 Ht 5' 2\" (1.575 m) Wt (!) 162.2 kg (357 lb 9.4 oz) SpO2 94% BMI 65.40 kg/m² No intake or output data in the 24 hours ending 11/27/18 1205 Physical Examination:  
 
  
Constitutional:  No acute distress, cooperative ENT:  Neck supple Resp:  CTA bilaterally. No accessory muscle use CV:  Regular rhythm, normal rate, no murmur GI:  Soft, non distended, non tender, normoactive bowel sounds Musculoskeletal:  LLE with redness - significantly improved Neurologic:  Moves all extremities. AAOx3 Data Review:  
 Review and/or order of clinical lab test 
Review and/or order of tests in the radiology section of CPT Review and/or order of tests in the medicine section of CPT Labs:  
 
Recent Labs  
  11/27/18 
0236 11/25/18 
0335 WBC 6.6 9.7 HGB 9.5* 9.4* HCT 29.7* 29.1*  
 241 Recent Labs  
  11/27/18 
0236 11/25/18 
0335  140  
K 3.5 3.5  102 CO2 28 29 BUN 8 7 CREA 0.70 0.59 * 101* CA 7.7* 8.2* No results for input(s): SGOT, GPT, ALT, AP, TBIL, TBILI, TP, ALB, GLOB, GGT, AML, LPSE in the last 72 hours. No lab exists for component: AMYP, HLPSE No results for input(s): INR, PTP, APTT in the last 72 hours. No lab exists for component: INREXT, INREXT No results for input(s): FE, TIBC, PSAT, FERR in the last 72 hours. No results found for: FOL, RBCF No results for input(s): PH, PCO2, PO2 in the last 72 hours. No results for input(s): CPK, CKNDX, TROIQ in the last 72 hours. No lab exists for component: CPKMB Lab Results Component Value Date/Time Cholesterol, total 197 01/24/2018 11:27 AM  
 HDL Cholesterol 36 (L) 01/24/2018 11:27 AM  
 LDL, calculated 144 (H) 01/24/2018 11:27 AM  
 Triglyceride 84 01/24/2018 11:27 AM  
 
Lab Results Component Value Date/Time Glucose (POC) 125 (H) 11/21/2018 04:41 PM  
 Glucose (POC) 129 (H) 11/21/2018 11:33 AM  
 
Lab Results Component Value Date/Time Color DARK YELLOW 11/20/2018 01:10 AM  
 Appearance CLOUDY (A) 11/20/2018 01:10 AM  
 Specific gravity 1.024 11/20/2018 01:10 AM  
 Specific gravity <1.005 08/21/2018 08:59 PM  
 pH (UA) 5.5 11/20/2018 01:10 AM  
 Protein NEGATIVE  11/20/2018 01:10 AM  
 Glucose NEGATIVE  11/20/2018 01:10 AM  
 Ketone NEGATIVE  11/20/2018 01:10 AM  
 Bilirubin NEGATIVE  11/20/2018 01:10 AM  
 Urobilinogen 1.0 11/20/2018 01:10 AM  
 Nitrites NEGATIVE  11/20/2018 01:10 AM  
 Leukocyte Esterase TRACE (A) 11/20/2018 01:10 AM  
 Epithelial cells FEW 11/20/2018 01:10 AM  
 Bacteria NEGATIVE  11/20/2018 01:10 AM  
 WBC 0-4 11/20/2018 01:10 AM  
 RBC >100 (H) 11/20/2018 01:10 AM  
 
 
 
Medications Reviewed:  
 
Current Facility-Administered Medications Medication Dose Route Frequency  bisacodyl (DULCOLAX) suppository 10 mg  10 mg Rectal DAILY PRN  
 senna-docusate (PERICOLACE) 8.6-50 mg per tablet 1 Tab  1 Tab Oral DAILY  alteplase (CATHFLO) 1 mg in sterile water (preservative free) 1 mL injection  1 mg InterCATHeter PRN  polyethylene glycol (MIRALAX) packet 17 g  17 g Oral DAILY  diphenhydrAMINE (BENADRYL) capsule 25 mg  25 mg Oral Q6H PRN  
 oxyCODONE IR (ROXICODONE) tablet 5 mg  5 mg Oral Q6H PRN  
 ceFAZolin (ANCEF) 2 g/20 mL in sterile water IV syringe  2 g IntraVENous Q8H  
  SALINE PERIPHERAL FLUSH Q8H soln 10-40 mL  10-40 mL InterCATHeter Q8H  
 ondansetron (ZOFRAN) injection 4 mg  4 mg IntraVENous Q4H PRN  pantoprazole (PROTONIX) tablet 40 mg  40 mg Oral DAILY  sodium chloride (NS) flush 5-10 mL  5-10 mL IntraVENous Q8H  
 sodium chloride (NS) flush 5-10 mL  5-10 mL IntraVENous PRN  
 enoxaparin (LOVENOX) injection 40 mg  40 mg SubCUTAneous Q24H  
 nicotine (NICODERM CQ) 21 mg/24 hr patch 1 Patch  1 Patch TransDERmal DAILY  
 
______________________________________________________________________ EXPECTED LENGTH OF STAY: 3d 16h ACTUAL LENGTH OF STAY:          7 Pepper Perry MD

## 2018-11-27 NOTE — PROGRESS NOTES
Bedside shift change report given to Elizabeth Stanley (oncoming nurse) by Jessy Jarquin (offgoing nurse). Report included the following information SBAR, Kardex, MAR and Recent Results.

## 2018-11-27 NOTE — PROGRESS NOTES
Problem: Mobility Impaired (Adult and Pediatric) Goal: *Acute Goals and Plan of Care (Insert Text) Physical Therapy Goals Initiated 11/25/2018 1. Patient will move from supine to sit and sit to supine , scoot up and down and roll side to side in bed with independence within 7 day(s). 2.  Patient will transfer from bed to chair and chair to bed with modified independence using the least restrictive device within 7 day(s). 3.  Patient will perform sit to stand with modified independence within 7 day(s). 4.  Patient will ambulate with modified independence for 150 feet with the least restrictive device within 7 day(s). 5.  Patient will ascend/descend 4 stairs with 1 handrail(s) with modified independence within 7 day(s). physical Therapy TREATMENT Patient: Pat Chauhan (52 y.o. female) Date: 11/27/2018 Diagnosis: SIRS (systemic inflammatory response syndrome) (HCC) Tachycardia Leukocytosis Cellulitis SIRS (systemic inflammatory response syndrome) (HCC) Procedure(s) (LRB): 
INFUSION CATHETER INSERTION (Bilateral) 6 Days Post-Op Precautions:   
Chart, physical therapy assessment, plan of care and goals were reviewed. ASSESSMENT: 
Pt has made dramatic progress in mobility in the last two days. She is now at an overall SUP level without AD and was able to complete 3 steps with SUP and use of L handrail. Pt continues to display antalgic gait and increased trunk excursion d/t body habitus and LLE pain. Pt's sons present and very supportive. At this time pt is mobilizing at a level safe for discharge home without HHPT or DME needs. Progression toward goals: 
[x]    Improving appropriately and progressing toward goals 
[]    Improving slowly and progressing toward goals 
[]    Not making progress toward goals and plan of care will be adjusted PLAN: 
Patient continues to benefit from skilled intervention to address the above impairments. Continue treatment per established plan of care. Discharge Recommendations:  None Further Equipment Recommendations for Discharge:  None SUBJECTIVE:  
Patient stated It's much better today. I went to the bathroom  By myself.  OBJECTIVE DATA SUMMARY:  
Critical Behavior: 
Neurologic State: Alert Orientation Level: Oriented X4 Cognition: Follows commands Functional Mobility Training: 
Bed Mobility: 
  
  
  
  
  
  
Transfers: 
Sit to Stand: Supervision Stand to Sit: Supervision Balance: 
Sitting: Intact Standing: IntactAmbulation/Gait Training: 
Distance (ft): 250 Feet (ft) Ambulation - Level of Assistance: Supervision Gait Abnormalities: Antalgic Base of Support: Widened Stairs: 
Number of Stairs Trained: 3 Stairs - Level of Assistance: Supervision Rail Use: Left Neuro Re-Education: 
 
Therapeutic Exercises:  
 
Pain: 
Pain Scale 1: Numeric (0 - 10) Pain Intensity 1: 1 Activity Tolerance:  
Good Please refer to the flowsheet for vital signs taken during this treatment. After treatment:  
[x]    Patient left in no apparent distress sitting up in chair 
[x]    Patient left in no apparent distress in bed 
[x]    Call bell left within reach [x]    Nursing notified 
[]    Caregiver present 
[]    Bed alarm activated COMMUNICATION/COLLABORATION:  
The patients plan of care was discussed with: Registered Nurse Dustin Celestin, PT Time Calculation: 15 mins

## 2018-11-27 NOTE — PROGRESS NOTES
NUTRITION Pt seen for:    
[x]           LOS       
RECOMMENDATIONS:  
No recommendations at this time. SUBJECTIVE/OBJECTIVE:  
Pt admitted for SIRS 2/2 cellulitis. ID following for abx with possible switch to oral meds tomorrow and discharge home. Appetite good per RN documentation. Wt gain likely related to LE swelling. Constipation resolved with start of bowel regimen on 11/25. Pt at low nutrition risk will rescreen per protocol. Diet:  Regular, 50g fat Intake: [x]           Good per RN documentation Weight Changes: [x]            Gain with LE edema and hx of lymphedema Wt Readings from Last 10 Encounters:  
11/27/18 (!) 162.2 kg (357 lb 9.4 oz) 10/28/18 146.4 kg (322 lb 12.1 oz) 10/16/18 145.2 kg (320 lb) 08/24/18 148.8 kg (328 lb)  
01/22/18 147.8 kg (325 lb 12.8 oz) 01/21/18 147 kg (324 lb 1.2 oz) 10/09/17 147.6 kg (325 lb 6.4 oz) 08/19/15 155.6 kg (343 lb) 12/08/14 (!) 166.3 kg (366 lb 9.6 oz) 11/06/14 (!) 165.7 kg (365 lb 4.8 oz) Nutrition Problems Identified:[]      None 
[x]           Constipation - resolved with bowel regimen rx. PLAN:  
[]           Obtained/adjusted food preferences/tolerances and/or snacks options []           Dislikes supplements will try a substitution  
[x]           Rescreen per screening protocol 
[]           Add Supplements Rescreen:  []            At Nutrition Risk [x]            Not at Nutrition Risk, rescreen per screening protocol Melinda Xavier RD

## 2018-11-27 NOTE — PROGRESS NOTES
Problem: Falls - Risk of 
Goal: *Absence of Falls Document Sadia Sanchezippo Fall Risk and appropriate interventions in the flowsheet. Outcome: Progressing Towards Goal 
Fall Risk Interventions: 
Mobility Interventions: Communicate number of staff needed for ambulation/transfer Mentation Interventions: Door open when patient unattended Medication Interventions: Patient to call before getting OOB Elimination Interventions: Call light in reach Problem: Pressure Injury - Risk of 
Goal: *Prevention of pressure injury Document Garret Scale and appropriate interventions in the flowsheet. Outcome: Progressing Towards Goal 
Pressure Injury Interventions: 
Sensory Interventions: Maintain/enhance activity level Moisture Interventions: Maintain skin hydration (lotion/cream) Activity Interventions: Pressure redistribution bed/mattress(bed type) Mobility Interventions: Float heels Nutrition Interventions: Document food/fluid/supplement intake Friction and Shear Interventions: HOB 30 degrees or less

## 2018-11-27 NOTE — WOUND CARE
Reconsult for application of ace wrap to left leg. 
  
Chart reviewed. Admitted DX:  Left low leg cellulitis.  Patient reports that she was scratching an area on her low leg that has psoriasis. Past Medical History:  lymphedema, chronic back pain, depression, GERD, morbid obesity, hemorrhoids, irregular heartbeat, thromboembolus. 
  
Assessment:  
Patient is A&O x 3, communicative, continent and sitting up in recliner. Bed: Bariatric plus Patient reports no pain. Heels intact without erythema. Edema and improved warm red erythema to left lower leg. Dry patch of skin to the anterior left ankle consistent with psoriasis. No open wounds. No exudate. Left leg:  Applied cast padding and a double layer of ace wrap. Will reevaluate left leg tomorrow. Skin Care & Pressure Prevention: 
Apply Aloe Vesta to dry patch on left ankle. Minimize layers of linen/pads under patient to optimize support surface.   
Turn/reposition approximately every 2 hours and offload heels. Specialty bed: Bariatric ordered via [employer]. Use only flat sheet and one incontinence pad. Please call Alfredo Reddy if not delivered.  REF# [id number]. 
  
Discussed above plan with patient, Dr Maame Reid. Patient wound benefit from outpatient lymphedema clinic follow up. Transition of Care: Plan to follow tomorrow with Dr Nate Beckman. Idalmis Craig, SOPHIEN MARGY [facility] [phone] Pager [phone]

## 2018-11-28 VITALS
HEIGHT: 62 IN | DIASTOLIC BLOOD PRESSURE: 83 MMHG | RESPIRATION RATE: 16 BRPM | WEIGHT: 293 LBS | BODY MASS INDEX: 53.92 KG/M2 | TEMPERATURE: 98 F | SYSTOLIC BLOOD PRESSURE: 134 MMHG | HEART RATE: 77 BPM | OXYGEN SATURATION: 95 %

## 2018-11-28 LAB
ANION GAP SERPL CALC-SCNC: 7 MMOL/L (ref 5–15)
BACTERIA SPEC CULT: ABNORMAL
BACTERIA SPEC CULT: ABNORMAL
BASOPHILS # BLD: 0.1 K/UL (ref 0–0.1)
BASOPHILS NFR BLD: 1 % (ref 0–1)
BUN SERPL-MCNC: 7 MG/DL (ref 6–20)
BUN/CREAT SERPL: 11 (ref 12–20)
CALCIUM SERPL-MCNC: 8.4 MG/DL (ref 8.5–10.1)
CHLORIDE SERPL-SCNC: 103 MMOL/L (ref 97–108)
CO2 SERPL-SCNC: 28 MMOL/L (ref 21–32)
CREAT SERPL-MCNC: 0.66 MG/DL (ref 0.55–1.02)
DIFFERENTIAL METHOD BLD: ABNORMAL
EOSINOPHIL # BLD: 0.3 K/UL (ref 0–0.4)
EOSINOPHIL NFR BLD: 4 % (ref 0–7)
ERYTHROCYTE [DISTWIDTH] IN BLOOD BY AUTOMATED COUNT: 12.8 % (ref 11.5–14.5)
GLUCOSE SERPL-MCNC: 89 MG/DL (ref 65–100)
GRAM STN SPEC: ABNORMAL
GRAM STN SPEC: ABNORMAL
HCT VFR BLD AUTO: 30.2 % (ref 35–47)
HGB BLD-MCNC: 9.5 G/DL (ref 11.5–16)
IMM GRANULOCYTES # BLD: 0 K/UL
IMM GRANULOCYTES NFR BLD AUTO: 0 %
LYMPHOCYTES # BLD: 0.7 K/UL (ref 0.8–3.5)
LYMPHOCYTES NFR BLD: 11 % (ref 12–49)
MCH RBC QN AUTO: 30.5 PG (ref 26–34)
MCHC RBC AUTO-ENTMCNC: 31.5 G/DL (ref 30–36.5)
MCV RBC AUTO: 97.1 FL (ref 80–99)
METAMYELOCYTES NFR BLD MANUAL: 1 %
MONOCYTES # BLD: 0.7 K/UL (ref 0–1)
MONOCYTES NFR BLD: 11 % (ref 5–13)
NEUTS BAND NFR BLD MANUAL: 1 % (ref 0–6)
NEUTS SEG # BLD: 4.6 K/UL (ref 1.8–8)
NEUTS SEG NFR BLD: 71 % (ref 32–75)
NRBC # BLD: 0 K/UL (ref 0–0.01)
NRBC BLD-RTO: 0 PER 100 WBC
PLATELET # BLD AUTO: 303 K/UL (ref 150–400)
PMV BLD AUTO: 10.5 FL (ref 8.9–12.9)
POTASSIUM SERPL-SCNC: 4.1 MMOL/L (ref 3.5–5.1)
RBC # BLD AUTO: 3.11 M/UL (ref 3.8–5.2)
RBC MORPH BLD: ABNORMAL
SERVICE CMNT-IMP: ABNORMAL
SODIUM SERPL-SCNC: 138 MMOL/L (ref 136–145)
WBC # BLD AUTO: 6.4 K/UL (ref 3.6–11)

## 2018-11-28 PROCEDURE — 94761 N-INVAS EAR/PLS OXIMETRY MLT: CPT

## 2018-11-28 PROCEDURE — 85025 COMPLETE CBC W/AUTO DIFF WBC: CPT

## 2018-11-28 PROCEDURE — 74011000250 HC RX REV CODE- 250

## 2018-11-28 PROCEDURE — 74011250636 HC RX REV CODE- 250/636: Performed by: INTERNAL MEDICINE

## 2018-11-28 PROCEDURE — 36415 COLL VENOUS BLD VENIPUNCTURE: CPT

## 2018-11-28 PROCEDURE — 80048 BASIC METABOLIC PNL TOTAL CA: CPT

## 2018-11-28 PROCEDURE — 74011250636 HC RX REV CODE- 250/636: Performed by: FAMILY MEDICINE

## 2018-11-28 PROCEDURE — 74011250637 HC RX REV CODE- 250/637: Performed by: INTERNAL MEDICINE

## 2018-11-28 PROCEDURE — 74011000250 HC RX REV CODE- 250: Performed by: INTERNAL MEDICINE

## 2018-11-28 RX ORDER — BACITRACIN 500 UNIT/G
PACKET (EA) TOPICAL
Status: COMPLETED
Start: 2018-11-28 | End: 2018-11-28

## 2018-11-28 RX ORDER — CLINDAMYCIN HYDROCHLORIDE 300 MG/1
300 CAPSULE ORAL 4 TIMES DAILY
Qty: 16 CAP | Refills: 0 | Status: SHIPPED | OUTPATIENT
Start: 2018-11-28 | End: 2019-11-03

## 2018-11-28 RX ORDER — TRAMADOL HYDROCHLORIDE AND ACETAMINOPHEN 37.5; 325 MG/1; MG/1
1 TABLET ORAL
Qty: 10 TAB | Refills: 0 | Status: SHIPPED | OUTPATIENT
Start: 2018-11-28 | End: 2019-11-03

## 2018-11-28 RX ORDER — CEFDINIR 300 MG/1
300 CAPSULE ORAL 2 TIMES DAILY
Qty: 6 CAP | Refills: 0 | Status: SHIPPED | OUTPATIENT
Start: 2018-11-28 | End: 2018-12-01

## 2018-11-28 RX ADMIN — ENOXAPARIN SODIUM 40 MG: 40 INJECTION SUBCUTANEOUS at 08:29

## 2018-11-28 RX ADMIN — BACITRACIN 1 PACKET: 500 OINTMENT TOPICAL at 10:07

## 2018-11-28 RX ADMIN — Medication 10 ML: at 03:24

## 2018-11-28 RX ADMIN — POLYETHYLENE GLYCOL 3350 17 G: 17 POWDER, FOR SOLUTION ORAL at 10:07

## 2018-11-28 RX ADMIN — Medication 2 G: at 03:24

## 2018-11-28 RX ADMIN — SENNOSIDES AND DOCUSATE SODIUM 1 TABLET: 8.6; 5 TABLET ORAL at 10:07

## 2018-11-28 RX ADMIN — Medication 10 ML: at 03:25

## 2018-11-28 NOTE — PROGRESS NOTES
Problem: Falls - Risk of 
Goal: *Absence of Falls Document April Carissa Fall Risk and appropriate interventions in the flowsheet. Outcome: Progressing Towards Goal 
Fall Risk Interventions: 
Mobility Interventions: Patient to call before getting OOB Mentation Interventions: Adequate sleep, hydration, pain control Medication Interventions: Patient to call before getting OOB Elimination Interventions: Call light in reach

## 2018-11-28 NOTE — PROGRESS NOTES
Bedside shift change report given to eric (oncoming nurse) by Jaime salazar (offgoing nurse). Report included the following information SBAR, Kardex and MAR.

## 2018-11-28 NOTE — PROGRESS NOTES
Problem: Pressure Injury - Risk of 
Goal: *Prevention of pressure injury Document Garret Scale and appropriate interventions in the flowsheet. Outcome: Progressing Towards Goal 
Pressure Injury Interventions: 
Sensory Interventions: Maintain/enhance activity level Moisture Interventions: Maintain skin hydration (lotion/cream) Activity Interventions: Pressure redistribution bed/mattress(bed type) Mobility Interventions: Float heels Nutrition Interventions: Document food/fluid/supplement intake Friction and Shear Interventions: HOB 30 degrees or less

## 2018-11-28 NOTE — PROGRESS NOTES
1100, I have reviewed discharge instructions with the patient. The patient verbalized understanding.

## 2018-11-28 NOTE — PROGRESS NOTES
Problem: Falls - Risk of 
Goal: *Absence of Falls Document Ramona Anaya Fall Risk and appropriate interventions in the flowsheet. Outcome: Progressing Towards Goal 
Fall Risk Interventions: 
Mobility Interventions: Communicate number of staff needed for ambulation/transfer Mentation Interventions: Adequate sleep, hydration, pain control Medication Interventions: Patient to call before getting OOB Elimination Interventions: Call light in reach Problem: Pressure Injury - Risk of 
Goal: *Prevention of pressure injury Document Garret Scale and appropriate interventions in the flowsheet. Outcome: Progressing Towards Goal 
Pressure Injury Interventions: 
Sensory Interventions: Maintain/enhance activity level Moisture Interventions: Maintain skin hydration (lotion/cream) Activity Interventions: Pressure redistribution bed/mattress(bed type) Mobility Interventions: Float heels Nutrition Interventions: Document food/fluid/supplement intake Friction and Shear Interventions: HOB 30 degrees or less

## 2018-11-28 NOTE — PROGRESS NOTES
Bedside shift change report given to Mc Huang RN (oncoming nurse) by Lei Ogden RN (offgoing nurse). Report included the following information SBAR and Kardex.

## 2018-11-28 NOTE — PROGRESS NOTES
Hospital follow-up PCP transitional care appointment has been scheduled with Dr. Avelina Cogan for Friday, 12/7/18 at 1:15 p.m. Pending patient discharge.   Colleen Cain, Care Management Specialist.

## 2018-11-28 NOTE — PROGRESS NOTES
Wound Culture came back positive for MRSA, D/W ID who will order Clindamycin. Appreciate ID Followup and recommendations

## 2018-11-28 NOTE — PROGRESS NOTES
Physical Therapy 11.28.18 Order received for \"needs lymphadema follow up,\" however acute PT has already been following patient 3x/week during her admission. Acute PT stated patient has advanced well with mobility and has no further follow up recommendation for strengthening/balance/safety concerns. Acute PT does not offer lymphadema services and therefore cannot assist patient with outpatient lymphadema follow up. This will be best coordinated through the patient and a referral from her PCP (note follow up with Dr. Golden Knapp scheduled for Friday December 7, 2018). Thank you.  
 
Kathleen Krueger, PT, DPT

## 2018-11-28 NOTE — DISCHARGE SUMMARY
Discharge Summary       PATIENT ID: Jesus Kaur  MRN: [id number]   YOB: 1975    DATE OF ADMISSION: 11/19/2018  9:39 PM    DATE OF DISCHARGE: 11/28/18  PRIMARY CARE PROVIDER: Roshan Kiran MD       DISCHARGING PROVIDER: Remington Toribio MD    To contact this individual call [phone] and ask the  to page. If unavailable ask to be transferred the Adult Hospitalist Department. CONSULTATIONS: IP CONSULT TO HOSPITALIST  IP CONSULT TO INFECTIOUS DISEASES      PROCEDURES/SURGERIES: Procedure(s) with comments:  INFUSION CATHETER INSERTION - central line placement    IMAGING  Ct Abd Pelv W Cont    Result Date: 11/20/2018  IMPRESSION: 1. Mildly enlarged left seminal lymph nodes with surrounding inflammation are likely reactive. There is no soft tissue gas or abscess. 2. No other acute abnormality in the abdomen or pelvis. Xr Chest Port    Result Date: 11/21/2018  IMPRESSION: No pneumothorax following right IJ catheter placement. Clear lungs. HISTORY OF PRESENT ILLNESS per admitting :  A 77-year-old white female with a past medical history of chronic back pain, depression, GERD, morbid obesity, hemorrhoids, irregular heartbeat, thromboembolus, unspecified, presented to the Emergency Department from home with a chief complaint of leg and thigh pain. Per the initial report, patient had reported left inner thigh pain, but now she more is concentrated more on pain in her left leg, which she notes is new onset, starting yesterday evening. The pain was reported to be a 10/10, aggravated with any touch, associated with redness and swelling. She also complained of having fever, nausea, increased urinary frequency, decreased urine output, nonproductive cough.   On arrival to the Emergency Department, the patient was febrile with a temperature of 103.1 degrees Fahrenheit, blood pressure 136/55, heart rate of 121, respiratory rate of 18, with saturation of 100% on room air. Labs showed an elevated WBC of 12,600, neutrophils of 92%. CT abdomen and pelvis with IV contrast showed mildly enlarged left inguinal lymph nodes with surrounding inflammatory changes, likely reactive, but no acute other abdominopelvic findings noted. Per ED MD provider's notes, the patient had evidence of erythema, warmth, and a foul odor on perineum and inner thigh region. Patient did not report having any recent vaginal discharge. She notes that she started her last normal menstrual period a few days ago. She notes she is currently on her cycle. Otherwise, no complaints of dizziness, lightheadedness, focal weakness, numbness, paresthesias, slurred speech, facial droop, headache, neck pain, new onset back pain, chest pain, shortness of breath, cough, congestion, palpitations, abdominal pain, vomiting. She notes she had bowel incontinence on the way to the hospital.  On current evaluation, after having reported inner thigh pain, she now denies the same.     HOSPITAL COURSE:   Cellulitis, improved: bilateral thighs, perineal, and left lower extremity.  -Wound Care   - Compression x 24 hours   -D/W ID - home with omnicef and Ace wrap d/w id   -Referral to Lymphedema clinic made    Severe sepsis: POA- 2nd to above  -On clindamycin and cefazolin. - ID following, follow-up micro.  NGTD   - improved Prior to D/C  Followup as Op for Wound recheck      Tobacco abuse: Encouraged smoking cessation.    -nicotine patch     Morbid obesity: Counseled on lifestyle modifications.      Gastroesophageal reflux disease: Continue omeprazole.       DISCHARGE DIAGNOSES / PLAN:      Patient Active Problem List   Diagnosis Code    Sepsis ([facility] A41.9    Cellulitis of right leg L03.115    Acute bronchitis J20.9    Lymph edema I89.0    Bilateral low back pain without sciatica M54.5    Obesity E66.9    Anxiety F41.9    Class 3 obesity without serious comorbidity with body mass index (BMI) of 50.0 to 59.9 in adult [id number]    Cellulitis L03.90    Leukocytosis D72.829    Tachycardia R00.0    SIRS (systemic inflammatory response syndrome) (HCC) R65.10               PENDING TEST RESULTS:   At the time of discharge the following test results are still pending: Final Wound Cultures    FOLLOW UP APPOINTMENTS:    Follow-up Information     Follow up With Specialties Details Why Contact Info    Jayy Daniels MD Internal Medicine On 12/7/2018 Hospital f/u PCP appointment Friday, 12/7/18 at 1:15 p.m. with Dr. Ania Winston [phone]  [phone]      [facility] LYMPHEDEMA CL Physical Therapy In 1 week Follow Up, For wound re-check [address] [id number] [facility]         ADDITIONAL CARE RECOMMENDATIONS:   · It is important that you take the medication exactly as they are prescribed. · Keep your medication in the bottles provided by the pharmacist and keep a list of the medication names, dosages, and times to be taken in your wallet. · Do not take other medications without consulting your doctor. · No drinking alcohol or driving car or operating machinery if you are on narcotic pain medications. Donot take sedating mediations if you are sleepy or confused. · Fall Precautions  · Keep Well Hydrated  · Report to your medical provider if you feel you have  developed allergies to medications  · Follow up with your PCP or Consultant for medication adjustments and refills  · Monitor for signs of fevers,chills,bleeding,chest pain and seek medical attention if you do so. DIET: Regular Diet      WOUND CARE:   Apply Aloe Vesta to dry patch on left ankle.   Minimize layers of linen/pads under patient to optimize support surface  Ace Wrap    DISCHARGE MEDICATIONS:  Current Discharge Medication List      START taking these medications    Details   traMADol-acetaminophen (ULTRACET) 37.5-325 mg per tablet Take 1 Tab by mouth every eight (8) hours as needed for Pain. Max Daily Amount: 3 Tabs. Qty: 10 Tab, Refills: 0    Associated Diagnoses: Cellulitis of lower extremity, unspecified laterality      cefdinir (OMNICEF) 300 mg capsule Take 1 Cap by mouth two (2) times a day for 3 days. Qty: 6 Cap, Refills: 0         CONTINUE these medications which have NOT CHANGED    Details   mometasone (ELOCON) 0.1 % topical cream Apply  to affected area daily. Qty: 15 g, Refills: 2    Associated Diagnoses: Rash      omeprazole (PRILOSEC OTC) 20 mg tablet Take 20 mg by mouth daily. acetaminophen (TYLENOL) 325 mg tablet Take 650 mg by mouth every four (4) hours as needed for Pain. Indications:  FEVER, PAIN         STOP taking these medications       albuterol (PROVENTIL HFA, VENTOLIN HFA, PROAIR HFA) 90 mcg/actuation inhaler Comments:   Reason for Stopping:         predniSONE (STERAPRED DS) 10 mg dose pack Comments:   Reason for Stopping:         azithromycin (ZITHROMAX) 250 mg tablet Comments:   Reason for Stopping:               All Micro Results     Procedure Component Value Units Date/Time    CULTURE, Rip Jain STAIN [[id number]]  (Abnormal) Collected:  11/26/18 1430    Order Status:  Completed Specimen:  Wound Drainage Updated:  11/27/18 4438     Special Requests: NO SPECIAL REQUESTS        GRAM STAIN NO WBC'S SEEN         NO ORGANISMS SEEN        Culture result:       POSSIBLE LIGHT STAPHYLOCOCCUS SPECIES          CULTURE, BLOOD, PAIRED [[id number]] Collected:  11/19/18 2226    Order Status:  Completed Specimen:  Blood Updated:  11/25/18 4511     Special Requests: NO SPECIAL REQUESTS        Culture result: NO GROWTH 5 DAYS             Recent Results (from the past 24 hour(s))   CBC WITH AUTOMATED DIFF    Collection Time: 11/28/18  3:23 AM   Result Value Ref Range    WBC 6.4 3.6 - 11.0 K/uL    RBC 3.11 (L) 3.80 - 5.20 M/uL    HGB 9.5 (L) 11.5 - 16.0 g/dL    HCT 30.2 (L) 35.0 - 47.0 %    MCV 97.1 80.0 - 99.0 FL    MCH 30.5 26.0 - 34.0 PG    MCHC 31.5 30.0 - 36.5 g/dL    RDW 12.8 11.5 - 14.5 %    PLATELET 289 416 - 074 K/uL    MPV 10.5 8.9 - 12.9 FL    NRBC 0.0 0  WBC    ABSOLUTE NRBC 0.00 0.00 - 0.01 K/uL    NEUTROPHILS 71 32 - 75 %    BAND NEUTROPHILS 1 0 - 6 %    LYMPHOCYTES 11 (L) 12 - 49 %    MONOCYTES 11 5 - 13 %    EOSINOPHILS 4 0 - 7 %    BASOPHILS 1 0 - 1 %    METAMYELOCYTES 1 (H) 0 %    IMMATURE GRANULOCYTES 0 %    ABS. NEUTROPHILS 4.6 1.8 - 8.0 K/UL    ABS. LYMPHOCYTES 0.7 (L) 0.8 - 3.5 K/UL    ABS. MONOCYTES 0.7 0.0 - 1.0 K/UL    ABS. EOSINOPHILS 0.3 0.0 - 0.4 K/UL    ABS. BASOPHILS 0.1 0.0 - 0.1 K/UL    ABS. IMM. GRANS. 0.0 K/UL    DF MANUAL      RBC COMMENTS ANISOCYTOSIS  1+       METABOLIC PANEL, BASIC    Collection Time: 11/28/18  3:23 AM   Result Value Ref Range    Sodium 138 136 - 145 mmol/L    Potassium 4.1 3.5 - 5.1 mmol/L    Chloride 103 97 - 108 mmol/L    CO2 28 21 - 32 mmol/L    Anion gap 7 5 - 15 mmol/L    Glucose 89 65 - 100 mg/dL    BUN 7 6 - 20 MG/DL    Creatinine 0.66 0.55 - 1.02 MG/DL    BUN/Creatinine ratio 11 (L) 12 - 20      GFR est AA >60 >60 ml/min/1.73m2    GFR est non-AA >60 >60 ml/min/1.73m2    Calcium 8.4 (L) 8.5 - 10.1 MG/DL           NOTIFY YOUR PHYSICIAN FOR ANY OF THE FOLLOWING:   Fever over 101 degrees for 24 hours. Chest pain, shortness of breath, fever, chills, nausea, vomiting, diarrhea, change in mentation, falling, weakness, bleeding. Severe pain or pain not relieved by medications. Or, any other signs or symptoms that you may have questions about.     DISPOSITION:    Home With:   OT  PT  HH  RN       Long term SNF/Inpatient Rehab   X Independent/assisted living    Hospice    Other:       PATIENT CONDITION AT DISCHARGE:     Functional status    Poor     Deconditioned    X Independent      Cognition    X Lucid     Forgetful     Dementia      Catheters/lines (plus indication)    Parson     PICC     PEG    X None      Code status   X  Full code     DNR      PHYSICAL EXAMINATION AT DISCHARGE:  Gen:  No distress, alert  HEENT:  Normal cephalic atraumatic, extra-occular movements are intact. Neck:  Supple, No JVD  Lungs:  Clear bilaterally, no wheeze, no rales, normal effort  Heart:  Regular Rate and Rhythm, normal S1 and S2, no edema  Abdomen:  Soft, non tender, normal bowel sounds, no guarding. Extremities:  Left Leg lympedema improved and Cellulitis Resolved  Neurological:  Awake and alert, CN's are intact, normal strength throughout extremities  Skin:  No rashes or moles  Mental Status:  Normal thought process, does not appear anxious      CHRONIC MEDICAL DIAGNOSES:  Problem List as of 11/28/2018 Date Reviewed: 11/20/2018          Codes Class Noted - Resolved    Cellulitis ICD-10-CM: L03.90  ICD-9-CM: 682.9  11/20/2018 - Present        Leukocytosis ICD-10-CM: D72.829  ICD-9-CM: 288.60  11/20/2018 - Present        Tachycardia ICD-10-CM: R00.0  ICD-9-CM: 785.0  11/20/2018 - Present        * (Principal) SIRS (systemic inflammatory response syndrome) ([facility] ICD-10-CM: R65.10  ICD-9-CM: 995.90  11/20/2018 - Present        Class 3 obesity without serious comorbidity with body mass index (BMI) of 50.0 to 59.9 in adult ICD-10-CM: [id number]  ICD-9-CM: Galina Linton  1/22/2018 - Present        Bilateral low back pain without sciatica ICD-10-CM: M54.5  ICD-9-CM: 724.2  8/19/2015 - Present        Obesity ICD-10-CM: E66.9  ICD-9-CM: 278.00  8/19/2015 - Present        Anxiety ICD-10-CM: F41.9  ICD-9-CM: 300.00  8/19/2015 - Present        Sepsis ([facility] ICD-10-CM: A41.9  ICD-9-CM: 038.9, 995.91  12/7/2014 - Present        Cellulitis of right leg ICD-10-CM: L03.115  ICD-9-CM: 682.6  12/7/2014 - Present        Acute bronchitis ICD-10-CM: J20.9  ICD-9-CM: 466.0  12/7/2014 - Present        Lymph edema ICD-10-CM: I89.0  ICD-9-CM: 457.1  12/7/2014 - Present                Discussed with patient and family.  Explained the importance of following up, Compliance with medications and recommendations on discharge,Side effect profile of medications were explained. Safety precautions at home and while taking pain medications also explained. All questions answered to the satisfaction of the patient/family. Discussed with consultant(s) who are agreeable to the discharge. Verbal and written instructions on discharge given. Explained about Discharge medications and side effect profile.         Thank you Jaime Mohan MD for taking care of your patient, Please call with any questions.       Greater than 36  minutes were spent with the patient on counseling and coordination of care    Signed:   Meredith Borges MD  11/28/2018  9:25 AM

## 2018-11-28 NOTE — DISCHARGE INSTRUCTIONS
Discharge Instructions       PATIENT ID: Maryam Mcneill  MRN: [id number]   YOB: 1975    DATE OF ADMISSION: 11/19/2018  9:39 PM    DATE OF DISCHARGE: 11/28/2018    PRIMARY CARE PROVIDER: Pauline Evans MD     ATTENDING PHYSICIAN: Cas Betancourt MD  DISCHARGING PROVIDER: Tello Dodge MD    To contact this individual call [phone] and ask the  to page. If unavailable ask to be transferred the Adult Hospitalist Department. FOLLOW UP APPOINTMENTS:   Follow-up Information     Follow up With Specialties Details Why Roxy Longoria MD Internal Medicine   [address]  [facility] [address]  [facility]  [phone]             ADDITIONAL CARE RECOMMENDATIONS:   · It is important that you take the medication exactly as they are prescribed. · Keep your medication in the bottles provided by the pharmacist and keep a list of the medication names, dosages, and times to be taken in your wallet. · Do not take other medications without consulting your doctor. · No drinking alcohol or driving car or operating machinery if you are on narcotic pain medications. Donot take sedating mediations if you are sleepy or confused. · Fall Precautions  · Keep Well Hydrated  · Report to your medical provider if you feel you have  developed allergies to medications  · Follow up with your PCP or Consultant for medication adjustments and refills  · Monitor for signs of fevers,chills,bleeding,chest pain and seek medical attention if you do so. · Follow up with Lymphedema Clinic       DIET: Regular Diet      WOUND CARE:   Apply Aloe Vesta to dry patch on left ankle. Left leg:  Daily remove ace wrap, cleanse and moisturize leg, reapply ace wrap. DISCHARGE MEDICATIONS:   See Medication Reconciliation Form    · It is important that you take the medication exactly as they are prescribed.    · Keep your medication in the bottles provided by the pharmacist and keep a list of the medication names, dosages, and times to be taken in your wallet. · Do not take other medications without consulting your doctor. NOTIFY YOUR PHYSICIAN FOR ANY OF THE FOLLOWING:   Fever over 101 degrees for 24 hours. Chest pain, shortness of breath, fever, chills, nausea, vomiting, diarrhea, change in mentation, falling, weakness, bleeding. Severe pain or pain not relieved by medications. Or, any other signs or symptoms that you may have questions about.       DISPOSITION:    Home With:   OT  PT  HH  RN       SNF/Inpatient Rehab/LTAC   X Independent/assisted living    Hospice    Other:       Signed:   Jose Carlos Sarah MD  11/28/2018  9:22 AM

## 2018-11-28 NOTE — WOUND CARE
Follow up of left low leg. Seen with Dr Sudeep Gonzalez. 
  
Chart reviewed. Admitted DX:  Left low leg cellulitis.  Patient reports that she was scratching an area on her low leg that has psoriasis. Past Medical History:  lymphedema, chronic back pain, depression, GERD, morbid obesity, hemorrhoids, irregular heartbeat, thromboembolus. 
  
Assessment:  
Patient is A&O x 3, communicative, continent and sitting up in recliner. Bed: Bariatric plus Patient reports no pain. Heels intact without erythema. Ace wrap removed:  Improved edema and improved warm red erythema to left lower leg. Dry patch of skin to the anterior left ankle consistent with psoriasis. No open wounds. No exudate. 
  
Left leg:  Reapplied cast padding and a double layer of ace wrap. Recommendation: 
Left leg:  Daily remove ace wrap, cleanse and moisturize leg, reapply ace wrap. 
  
Skin Care & Pressure Prevention: 
Apply Aloe Vesta to dry patch on left ankle. Minimize layers of linen/pads under patient to optimize support surface.   
Turn/reposition approximately every 2 hours and offload heels. Specialty bed: Bariatric ordered via [employer]. Use only flat sheet and one incontinence pad. Please call [facility] if not delivered.  REF# [id number]. 
  
Discussed above plan with patient, Dr Pelon Corrales. Patient wound benefit from outpatient lymphedema clinic follow up. 
  
Transition of Care: Will follow routinely while admitted. 
  
SOPHIE QuijanoN MARGY [facility] Wound Care Office [phone] Pager [phone]

## 2018-11-30 ENCOUNTER — DOCUMENTATION ONLY (OUTPATIENT)
Dept: INTERNAL MEDICINE CLINIC | Age: 43
End: 2018-11-30

## 2018-11-30 NOTE — PROGRESS NOTES
Pt with cellulitis left leg  Discharged on 11/28  She was getting cefazolin + clinda  On omnicef  Called her and added clindamycin for 5 days instead as the skin patch culture had MRSA  She will follow up with lymphedema clinic-   Will see me PRN

## 2019-03-13 ENCOUNTER — TELEPHONE (OUTPATIENT)
Dept: INTERNAL MEDICINE CLINIC | Age: 44
End: 2019-03-13

## 2019-03-13 NOTE — TELEPHONE ENCOUNTER
#[phone] pt is asking for an appt on Friday, 3-15-19. She needs thyroid, potassium checked as she is having different symptoms that she doesn't understand, like pain in the bottom of addis, light headed, a shocking/tingling feeling in her head. Pt is worried about this as she is over weight she states. She is worried about her blood pressure as face is flushed and gets weird feeling in her head. She also has constant ear ringing that goes up and down in volume, mostly at night when she goes to bed. Please call pt to get her an appt. Thanks.

## 2019-03-15 NOTE — TELEPHONE ENCOUNTER
Pt states you called a wrong number as her phone never rang for the #[phone]. Pt wants a call back today as she had asked for an appt today. Pt states she would also like to be checked for dm.

## 2019-03-15 NOTE — TELEPHONE ENCOUNTER
Spoke with patient. Two pt identifiers confirmed. Patient offered an appointment with Dr. Guevara Yuan on 03/18/19. Patient accepted. Patient advised if anything changes or if unable to keep this appointment to call the office  Pt verbalized understanding of information discussed w/ no further questions at this time.

## 2019-04-25 ENCOUNTER — TELEPHONE (OUTPATIENT)
Dept: INTERNAL MEDICINE CLINIC | Age: 44
End: 2019-04-25

## 2019-04-25 NOTE — TELEPHONE ENCOUNTER
#[phone] pt will be seeing eye specialist, Dr. Lurdes Palmer on 4-30-19 and they need o/n &  referral as to why pt needs to be seen.       Fax that to them at #[phone]

## 2019-04-26 NOTE — TELEPHONE ENCOUNTER
Spoke with patient. Two pt identifiers confirmed. Patient advised that she will need to be seen in the office for her symptoms before we can give her a referral.  Patient states that she can not come in before 04/30/19. Patient offered an appointment on 05/03/19. Patient accepted. Patient advised if anything changes or if unable to keep this appointment to call the office  Pt verbalized understanding of information discussed w/ no further questions at this time.

## 2019-05-24 ENCOUNTER — TELEPHONE (OUTPATIENT)
Dept: INTERNAL MEDICINE CLINIC | Age: 44
End: 2019-05-24

## 2019-05-24 NOTE — TELEPHONE ENCOUNTER
Spoke with patient. Two pt identifiers confirmed. Patient states that she has been having a lot of issues with dizziness and lightheadedness. Patient states that her ears feel full. Patient states that she bought a blood pressure machine and her blood pressure has been elevated 166/100. Patient advised that she should be evaluated at Urgent Care. Patient states that she would prefer to see Dr. Elena Woods. Patient offered an appointment with Dr. Elena Woods on 05/30/19. Appointment accepted. Patient advised if symptoms worsen, she should seek immediate medical attention. Pt verbalized understanding of information discussed w/ no further questions at this time.

## 2019-05-24 NOTE — TELEPHONE ENCOUNTER
Patient states she need a call back to get an Acute Appt today for symptoms of dizziness, Ear pain-fullness, headache & patient reports feeling off. Please call to discuss.  Thank you

## 2019-05-24 NOTE — TELEPHONE ENCOUNTER
MD Prisca Ozuna, LPN   Caller: Unspecified (Today, 10:48 AM)             Agree with recommendation. If we can cancellation see if we can work her in.       Noted

## 2019-05-29 ENCOUNTER — TELEPHONE (OUTPATIENT)
Dept: INTERNAL MEDICINE CLINIC | Age: 44
End: 2019-05-29

## 2019-05-29 NOTE — TELEPHONE ENCOUNTER
Pt is requesting to speak with nurse due to the [facility] D warning: consult your physician if you have thyroid problems.  Pt would like to discuss before starting medication.     Best contact: [phone]     C/P Sandip Gay

## 2019-05-30 NOTE — TELEPHONE ENCOUNTER
MD Yahir Sosa, LPN   Caller: Unspecified Ruth Cruz, 12:26 PM)             No allegra D. Only plain allegra recommended.  The \"d\" is sudafed, which is a stimulant. Spoke with patient. Two pt identifiers confirmed. Patient advised of the above message from . Pt verbalized understanding of information discussed w/ no further questions at this time.

## 2019-06-19 ENCOUNTER — HOSPITAL ENCOUNTER (EMERGENCY)
Age: 44
Discharge: LEFT BEFORE DISCHARGE | End: 2019-06-19
Attending: EMERGENCY MEDICINE | Admitting: EMERGENCY MEDICINE
Payer: SELF-PAY

## 2019-06-19 VITALS
SYSTOLIC BLOOD PRESSURE: 165 MMHG | HEIGHT: 62 IN | TEMPERATURE: 98.9 F | WEIGHT: 293 LBS | OXYGEN SATURATION: 96 % | BODY MASS INDEX: 53.92 KG/M2 | HEART RATE: 79 BPM | DIASTOLIC BLOOD PRESSURE: 103 MMHG | RESPIRATION RATE: 18 BRPM

## 2019-06-19 LAB
ALBUMIN SERPL-MCNC: 3.9 G/DL (ref 3.5–5)
ALBUMIN/GLOB SERPL: 1 {RATIO} (ref 1.1–2.2)
ALP SERPL-CCNC: 69 U/L (ref 45–117)
ALT SERPL-CCNC: 23 U/L (ref 12–78)
ANION GAP SERPL CALC-SCNC: 4 MMOL/L (ref 5–15)
AST SERPL-CCNC: 13 U/L (ref 15–37)
BASOPHILS # BLD: 0.1 K/UL (ref 0–0.1)
BASOPHILS NFR BLD: 1 % (ref 0–1)
BILIRUB SERPL-MCNC: 0.5 MG/DL (ref 0.2–1)
BUN SERPL-MCNC: 12 MG/DL (ref 6–20)
BUN/CREAT SERPL: 14 (ref 12–20)
CALCIUM SERPL-MCNC: 9.1 MG/DL (ref 8.5–10.1)
CHLORIDE SERPL-SCNC: 106 MMOL/L (ref 97–108)
CO2 SERPL-SCNC: 28 MMOL/L (ref 21–32)
CREAT SERPL-MCNC: 0.87 MG/DL (ref 0.55–1.02)
DIFFERENTIAL METHOD BLD: ABNORMAL
EOSINOPHIL # BLD: 0.1 K/UL (ref 0–0.4)
EOSINOPHIL NFR BLD: 1 % (ref 0–7)
ERYTHROCYTE [DISTWIDTH] IN BLOOD BY AUTOMATED COUNT: 12.4 % (ref 11.5–14.5)
GLOBULIN SER CALC-MCNC: 4 G/DL (ref 2–4)
GLUCOSE SERPL-MCNC: 90 MG/DL (ref 65–100)
HCT VFR BLD AUTO: 40.8 % (ref 35–47)
HGB BLD-MCNC: 13.6 G/DL (ref 11.5–16)
IMM GRANULOCYTES # BLD AUTO: 0 K/UL (ref 0–0.04)
IMM GRANULOCYTES NFR BLD AUTO: 0 % (ref 0–0.5)
LYMPHOCYTES # BLD: 1.4 K/UL (ref 0.8–3.5)
LYMPHOCYTES NFR BLD: 14 % (ref 12–49)
MCH RBC QN AUTO: 30.4 PG (ref 26–34)
MCHC RBC AUTO-ENTMCNC: 33.3 G/DL (ref 30–36.5)
MCV RBC AUTO: 91.3 FL (ref 80–99)
MONOCYTES # BLD: 0.4 K/UL (ref 0–1)
MONOCYTES NFR BLD: 4 % (ref 5–13)
NEUTS SEG # BLD: 7.7 K/UL (ref 1.8–8)
NEUTS SEG NFR BLD: 80 % (ref 32–75)
NRBC # BLD: 0 K/UL (ref 0–0.01)
NRBC BLD-RTO: 0 PER 100 WBC
PLATELET # BLD AUTO: 274 K/UL (ref 150–400)
PMV BLD AUTO: 10.7 FL (ref 8.9–12.9)
POTASSIUM SERPL-SCNC: 4 MMOL/L (ref 3.5–5.1)
PROT SERPL-MCNC: 7.9 G/DL (ref 6.4–8.2)
RBC # BLD AUTO: 4.47 M/UL (ref 3.8–5.2)
SODIUM SERPL-SCNC: 138 MMOL/L (ref 136–145)
WBC # BLD AUTO: 9.7 K/UL (ref 3.6–11)

## 2019-06-19 PROCEDURE — 75810000275 HC EMERGENCY DEPT VISIT NO LEVEL OF CARE

## 2019-06-19 PROCEDURE — 36415 COLL VENOUS BLD VENIPUNCTURE: CPT

## 2019-06-19 PROCEDURE — 85025 COMPLETE CBC W/AUTO DIFF WBC: CPT

## 2019-06-19 PROCEDURE — 80053 COMPREHEN METABOLIC PANEL: CPT

## 2019-06-19 RX ORDER — BUTALBITAL, ACETAMINOPHEN AND CAFFEINE 50; 325; 40 MG/1; MG/1; MG/1
1 TABLET ORAL
Status: DISCONTINUED | OUTPATIENT
Start: 2019-06-19 | End: 2019-06-19 | Stop reason: HOSPADM

## 2019-11-03 ENCOUNTER — HOSPITAL ENCOUNTER (EMERGENCY)
Age: 44
Discharge: HOME | End: 2019-11-03
Attending: EMERGENCY MEDICINE
Payer: SELF-PAY

## 2019-11-03 ENCOUNTER — APPOINTMENT (OUTPATIENT)
Dept: GENERAL RADIOLOGY | Age: 44
End: 2019-11-03
Attending: EMERGENCY MEDICINE
Payer: SELF-PAY

## 2019-11-03 VITALS
RESPIRATION RATE: 18 BRPM | TEMPERATURE: 97.7 F | HEART RATE: 90 BPM | DIASTOLIC BLOOD PRESSURE: 76 MMHG | HEIGHT: 62 IN | WEIGHT: 293 LBS | OXYGEN SATURATION: 96 % | BODY MASS INDEX: 53.92 KG/M2 | SYSTOLIC BLOOD PRESSURE: 134 MMHG

## 2019-11-03 DIAGNOSIS — J44.1 COPD EXACERBATION (HCC): ICD-10-CM

## 2019-11-03 DIAGNOSIS — J20.9 ACUTE BRONCHITIS, UNSPECIFIED ORGANISM: Primary | ICD-10-CM

## 2019-11-03 PROCEDURE — 71046 X-RAY EXAM CHEST 2 VIEWS: CPT

## 2019-11-03 PROCEDURE — 99283 EMERGENCY DEPT VISIT LOW MDM: CPT

## 2019-11-03 RX ORDER — PREDNISONE 20 MG/1
40 TABLET ORAL DAILY
Qty: 10 TAB | Refills: 0 | Status: SHIPPED | OUTPATIENT
Start: 2019-11-03 | End: 2019-11-08

## 2019-11-03 RX ORDER — AZITHROMYCIN 250 MG/1
TABLET, FILM COATED ORAL
Qty: 6 TAB | Refills: 0 | Status: SHIPPED | OUTPATIENT
Start: 2019-11-03 | End: 2019-11-08

## 2019-11-03 RX ORDER — DIPHENHYDRAMINE HCL 25 MG
50 CAPSULE ORAL
COMMUNITY

## 2019-11-03 RX ORDER — ALBUTEROL SULFATE 90 UG/1
2 AEROSOL, METERED RESPIRATORY (INHALATION)
Qty: 1 INHALER | Refills: 0 | Status: SHIPPED | OUTPATIENT
Start: 2019-11-03

## 2019-11-03 NOTE — ED NOTES
MD Klein reviewed discharge instructions with the patient and spouse. The patient and spouse verbalized understanding. Patient discharged home with stable vitals. Patient out of ED ambulatory with discharge instructions in hand. Opportunity for questions and clarification provided. No further complaints noted at this time.

## 2019-11-03 NOTE — ED NOTES
Assumed care from triage. Patient presents to the ED complaining of a productive cough for one-two weeks. Patient reports wheezing and says \"my nostrils keep closing up\". Patient placed on the monitor x2 and provided with her call bell. Patient's  at bedside.

## 2019-11-03 NOTE — DISCHARGE INSTRUCTIONS
Patient Education        Chronic Obstructive Pulmonary Disease (COPD): Care Instructions  Your Care Instructions    Chronic obstructive pulmonary disease (COPD) is a general term for a group of lung diseases, including emphysema and chronic bronchitis. People with COPD have decreased airflow in and out of the lungs, which makes it hard to breathe. The airways also can get clogged with thick mucus. Cigarette smoking is a major cause of COPD. Although there is no cure for COPD, you can slow its progress. Following your treatment plan and taking care of yourself can help you feel better and live longer. Follow-up care is a key part of your treatment and safety. Be sure to make and go to all appointments, and call your doctor if you are having problems. It's also a good idea to know your test results and keep a list of the medicines you take. How can you care for yourself at home?   Staying healthy    · Do not smoke. This is the most important step you can take to prevent more damage to your lungs. If you need help quitting, talk to your doctor about stop-smoking programs and medicines. These can increase your chances of quitting for good.     · Avoid colds and flu. Get a pneumococcal vaccine shot. If you have had one before, ask your doctor whether you need a second dose. Get the flu vaccine every fall. If you must be around people with colds or the flu, wash your hands often.     · Avoid secondhand smoke, air pollution, and high altitudes. Also avoid cold, dry air and hot, humid air. Stay at home with your windows closed when air pollution is bad.    Medicines and oxygen therapy    · Take your medicines exactly as prescribed. Call your doctor if you think you are having a problem with your medicine.     · You may be taking medicines such as:  ? Bronchodilators. These help open your airways and make breathing easier. Bronchodilators are either short-acting (work for 6 to 9 hours) or long-acting (work for 24 hours). You inhale most bronchodilators, so they start to act quickly. Always carry your quick-relief inhaler with you in case you need it while you are away from home. ? Corticosteroids (prednisone, budesonide). These reduce airway inflammation. They come in pill or inhaled form. You must take these medicines every day for them to work well.     · A spacer may help you get more inhaled medicine to your lungs. Ask your doctor or pharmacist if a spacer is right for you. If it is, ask how to use it properly.     · Do not take any vitamins, over-the-counter medicine, or herbal products without talking to your doctor first.     · If your doctor prescribed antibiotics, take them as directed. Do not stop taking them just because you feel better. You need to take the full course of antibiotics.     · Oxygen therapy boosts the amount of oxygen in your blood and helps you breathe easier. Use the flow rate your doctor has recommended, and do not change it without talking to your doctor first.   Activity    · Get regular exercise. Walking is an easy way to get exercise. Start out slowly, and walk a little more each day.     · Pay attention to your breathing. You are exercising too hard if you cannot talk while you are exercising.     · Take short rest breaks when doing household chores and other activities.     · Learn breathing methods--such as breathing through pursed lips--to help you become less short of breath.     · If your doctor has not set you up with a pulmonary rehabilitation program, talk to him or her about whether rehab is right for you. Rehab includes exercise programs, education about your disease and how to manage it, help with diet and other changes, and emotional support. Diet    · Eat regular, healthy meals. Use bronchodilators about 1 hour before you eat to make it easier to eat. Eat several small meals instead of three large ones.  Drink beverages at the end of the meal. Avoid foods that are hard to chew.     · Eat foods that contain protein so that you do not lose muscle mass.     · Talk with your doctor if you gain too much weight or if you lose weight without trying.    Mental health    · Talk to your family, friends, or a therapist about your feelings. It is normal to feel frightened, angry, hopeless, helpless, and even guilty. Talking openly about bad feelings can help you cope. If these feelings last, talk to your doctor. When should you call for help? Call 911 anytime you think you may need emergency care. For example, call if:    · You have severe trouble breathing.    Call your doctor now or seek immediate medical care if:    · You have new or worse trouble breathing.     · You cough up blood.     · You have a fever.    Watch closely for changes in your health, and be sure to contact your doctor if:    · You cough more deeply or more often, especially if you notice more mucus or a change in the color of your mucus.     · You have new or worse swelling in your legs or belly.     · You are not getting better as expected. Where can you learn more? Go to http://elissa-devorah.info/. St[place] in the search box to learn more about \"Chronic Obstructive Pulmonary Disease (COPD): Care Instructions. \"  Current as of: June 9, 2019  Content Version: 12.2  © 4189-1818 [employer]. Care instructions adapted under license by [employer] (which disclaims liability or warranty for this information). If you have questions about a medical condition or this instruction, always ask your healthcare professional. [place] [address] any warranty or liability for your use of this information. Patient Education        Bronchitis: Care Instructions  Your Care Instructions    Bronchitis is inflammation of the bronchial tubes, which carry air to the lungs. The tubes swell and produce mucus, or phlegm. The mucus and inflamed bronchial tubes make you cough.  You may have trouble breathing. Most cases of bronchitis are caused by viruses like those that cause colds. Antibiotics usually do not help and they may be harmful. Bronchitis usually develops rapidly and lasts about 2 to 3 weeks in otherwise healthy people. Follow-up care is a key part of your treatment and safety. Be sure to make and go to all appointments, and call your doctor if you are having problems. It's also a good idea to know your test results and keep a list of the medicines you take. How can you care for yourself at home? · Take all medicines exactly as prescribed. Call your doctor if you think you are having a problem with your medicine. · Get some extra rest.  · Take an over-the-counter pain medicine, such as acetaminophen (Tylenol), ibuprofen (Advil, Motrin), or naproxen (Aleve) to reduce fever and relieve body aches. Read and follow all instructions on the label. · Do not take two or more pain medicines at the same time unless the doctor told you to. Many pain medicines have acetaminophen, which is Tylenol. Too much acetaminophen (Tylenol) can be harmful. · Take an over-the-counter cough medicine that contains dextromethorphan to help quiet a dry, hacking cough so that you can sleep. Avoid cough medicines that have more than one active ingredient. Read and follow all instructions on the label. · Breathe moist air from a humidifier, hot shower, or sink filled with hot water. The heat and moisture will thin mucus so you can cough it out. · Do not smoke. Smoking can make bronchitis worse. If you need help quitting, talk to your doctor about stop-smoking programs and medicines. These can increase your chances of quitting for good. When should you call for help? Call 911 anytime you think you may need emergency care.  For example, call if:    · You have severe trouble breathing.    Call your doctor now or seek immediate medical care if:    · You have new or worse trouble breathing.     · You cough up dark brown or bloody mucus (sputum).     · You have a new or higher fever.     · You have a new rash.    Watch closely for changes in your health, and be sure to contact your doctor if:    · You cough more deeply or more often, especially if you notice more mucus or a change in the color of your mucus.     · You are not getting better as expected. Where can you learn more? Go to http://elissa-devorah.info/. Enter H333 in the search box to learn more about \"Bronchitis: Care Instructions. \"  Current as of: June 9, 2019  Content Version: 12.2  © 2389-1330 [employer]. Care instructions adapted under license by [employer] (which disclaims liability or warranty for this information). If you have questions about a medical condition or this instruction, always ask your healthcare professional. [address] any warranty or liability for your use of this information.

## 2019-11-05 NOTE — ED PROVIDER NOTES
EMERGENCY DEPARTMENT HISTORY AND PHYSICAL EXAM      Date: 11/3/2019  Patient Name: Bebo Martin    Please note that this dictation was completed with Ev[employer], the computer voice recognition software. Quite often unanticipated grammatical, syntax, homophones, and other interpretive errors are inadvertently transcribed by the computer software. Please disregard these errors. Please excuse any errors that have escaped final proofreading. History of Presenting Illness     Chief Complaint   Patient presents with    Cough     for 1-2 weeks and can't see her PCP because of lack of availability. Reports wheezing. She says her nostrils keep closing up       History Provided By: Patient    HPI: Bebo Martin, 45 y.o. female, presented the emergency department complaining of cough, congestion, shortness of breath. Symptoms been going on for approximately 2 weeks, has not been able to get into her PCP. No exacerbating or relieving factors. Not relieved with over-the-counter cold medicines. No fevers or chills. No chest pain. No unilateral leg pain or leg swelling. PCP: Carlos Hammer MD    No current facility-administered medications on file prior to encounter. Current Outpatient Medications on File Prior to Encounter   Medication Sig Dispense Refill    diphenhydrAMINE (BENADRYL) 25 mg capsule Take 50 mg by mouth every six (6) hours as needed. Past History     Past Medical History:  Past Medical History:   Diagnosis Date    Back pain     Cough     Depression     GERD (gastroesophageal reflux disease)     Hemorrhoid     Irregular heart beat     SOB (shortness of breath)     Stress     Thromboembolus (HCC)     Tiredness     Wheezing        Past Surgical History:  History reviewed. No pertinent surgical history.     Family History:  Family History   Problem Relation Age of Onset    Mental Retardation Mother     Heart Disease Mother     COPD Mother     Cancer Maternal Grandmother     Diabetes Maternal Grandmother        Social History:  Social History     Tobacco Use    Smoking status: Current Every Day Smoker     Packs/day: 1.50    Smokeless tobacco: Current User   Substance Use Topics    Alcohol use: No    Drug use: No       Allergies: Allergies   Allergen Reactions    Pcn [Penicillins] Anaphylaxis     Had HIVES  Has taken keflex before    Bactrim [Sulfamethoprim] Hives    Doxycycline Hives    Ibuprofen Hives    Naprosyn [Naproxen] Nausea and Vomiting         Review of Systems   Review of Systems   Constitutional: Negative for chills and fever. HENT: Positive for congestion. Negative for sore throat. Eyes: Negative for visual disturbance. Respiratory: Positive for cough and shortness of breath. Cardiovascular: Negative for chest pain and leg swelling. Gastrointestinal: Negative for abdominal pain, blood in stool, diarrhea and nausea. Endocrine: Negative for polyuria. Genitourinary: Negative for dysuria, flank pain, vaginal bleeding and vaginal discharge. Musculoskeletal: Negative for myalgias. Skin: Negative for rash. Allergic/Immunologic: Negative for immunocompromised state. Neurological: Negative for weakness and headaches. Psychiatric/Behavioral: Negative for confusion. Physical Exam   Physical Exam   Constitutional: She is oriented to person, place, and time. She appears well-developed and well-nourished. HENT:   Head: Normocephalic and atraumatic. Moist mucous membranes   Eyes: Pupils are equal, round, and reactive to light. Conjunctivae are normal. Right eye exhibits no discharge. Left eye exhibits no discharge. Neck: Normal range of motion. Neck supple. No tracheal deviation present. Cardiovascular: Normal rate, regular rhythm and normal heart sounds. No murmur heard. Pulmonary/Chest: Effort normal. No respiratory distress. She has wheezes. She has no rales. Abdominal: Soft.  Bowel sounds are normal. There is no tenderness. There is no rebound and no guarding. Musculoskeletal: Normal range of motion. She exhibits no edema, tenderness or deformity. Neurological: She is alert and oriented to person, place, and time. Skin: Skin is warm and dry. No rash noted. No erythema. Psychiatric: Her behavior is normal.   Nursing note and vitals reviewed. Diagnostic Study Results     Labs -   No results found for this or any previous visit (from the past 12 hour(s)). Radiologic Studies -   XR CHEST PA LAT   Final Result   IMPRESSION:   NORMAL CHEST. CT Results  (Last 48 hours)    None        CXR Results  (Last 48 hours)               11/03/19 0143  XR CHEST PA LAT Final result    Impression:  IMPRESSION:   NORMAL CHEST. Narrative:  History: Cough. Frontal and lateral views of the chest show clear lungs. The heart, mediastinum   and pulmonary vasculature are normal.  The bony thorax is unremarkable. Medical Decision Making   I am the first provider for this patient. I reviewed the vital signs, available nursing notes, past medical history, past surgical history, family history and social history. Vital Signs-Reviewed the patient's vital signs. No data found. Records Reviewed: Nursing Notes and Old Medical Records    Provider Notes (Medical Decision Making):   Nontoxic obese female here with what appears to be bronchitis. Symptoms been going on for 2 weeks. Given duration of symptoms will prescribe antibiotic. She will likely be more benefit by the albuterol and steroids. ED Course:   Initial assessment performed. The patients presenting problems have been discussed, and they are in agreement with the care plan formulated and outlined with them. I have encouraged them to ask questions as they arise throughout their visit. Critical Care Time:   none    Disposition:  DISCHARGE NOTE  Patients results have been reviewed with them. Patient and/or family have verbally conveyed their understanding and agreement of the patient's signs, symptoms, diagnosis, treatment and prognosis and additionally agree to follow up as recommended or return to the Emergency Room should their condition change or have any new concerns prior to their follow-up appointment. Patient verbally agrees with the care-plan and verbally conveys that all of their questions have been answered. Discharge instructions have also been provided to the patient with some educational information regarding their diagnosis as well a list of reasons why they would want to return to the ER prior to their follow-up appointment should their condition change. PLAN:  1. Discharge Medication List as of 11/3/2019  2:55 AM      START taking these medications    Details   azithromycin (ZITHROMAX Z-ANDREW) 250 mg tablet As directed, Normal, Disp-6 Tab, R-0      predniSONE (DELTASONE) 20 mg tablet Take 40 mg by mouth daily for 5 days. With Breakfast, Normal, Disp-10 Tab, R-0      albuterol (PROVENTIL HFA, VENTOLIN HFA, PROAIR HFA) 90 mcg/actuation inhaler Take 2 Puffs by inhalation every six (6) hours as needed for Wheezing., Normal, Disp-1 Inhaler, R-0         CONTINUE these medications which have NOT CHANGED    Details   diphenhydrAMINE (BENADRYL) 25 mg capsule Take 50 mg by mouth every six (6) hours as needed., Historical Med           2. Follow-up Information     Follow up With Specialties Details Why Tripp Falk MD Internal Medicine Schedule an appointment as soon as possible for a visit  [address] [place] [address]  [phone]      [facility] EMERGENCY DEPT Emergency Medicine  If symptoms worsen [address]  [address] [facility]  [phone]          Return to ED if worse     Diagnosis     Clinical Impression:   1. Acute bronchitis, unspecified organism    2. COPD exacerbation ([address]        Attestations:   This note was completed by Flakita Artis, DO

## 2020-07-20 ENCOUNTER — APPOINTMENT (OUTPATIENT)
Dept: ULTRASOUND IMAGING | Age: 45
End: 2020-07-20
Attending: EMERGENCY MEDICINE
Payer: SELF-PAY

## 2020-07-20 ENCOUNTER — HOSPITAL ENCOUNTER (EMERGENCY)
Age: 45
Discharge: HOME | End: 2020-07-20
Attending: EMERGENCY MEDICINE | Admitting: EMERGENCY MEDICINE
Payer: SELF-PAY

## 2020-07-20 VITALS
BODY MASS INDEX: 53.92 KG/M2 | HEIGHT: 62 IN | OXYGEN SATURATION: 99 % | RESPIRATION RATE: 16 BRPM | TEMPERATURE: 98 F | WEIGHT: 293 LBS | HEART RATE: 81 BPM | DIASTOLIC BLOOD PRESSURE: 80 MMHG | SYSTOLIC BLOOD PRESSURE: 157 MMHG

## 2020-07-20 DIAGNOSIS — M79.604 ACUTE LEG PAIN, RIGHT: ICD-10-CM

## 2020-07-20 DIAGNOSIS — R20.2 PARESTHESIA OF SKIN: Primary | ICD-10-CM

## 2020-07-20 PROCEDURE — 99281 EMR DPT VST MAYX REQ PHY/QHP: CPT

## 2020-07-20 PROCEDURE — 93971 EXTREMITY STUDY: CPT

## 2020-07-20 NOTE — DISCHARGE INSTRUCTIONS
RIGHT LOWER EXTREMITY VENOUS DUPLEX ULTRASOUND     INDICATION: Leg swollen, pain, DVT suspected.     COMPARISON: None.     PROCEDURE: Grayscale, color, and spectral Doppler ultrasound imaging of the right lower extremity veins was performed.     FINDINGS:         The right common femoral, superficial femoral, and popliteal veins are patent by color flow and response to augmentation. Grayscale images are compromised by patient body habitus.     No abnormality is shown in the area of concern, in the right, anterior, superficial, distal thigh.     IMPRESSION:   Patent deep venous system by color flow. Patient Education        Leg Pain: Care Instructions  Your Care Instructions  Many things can cause leg pain. Too much exercise or overuse can cause a muscle cramp (or charley horse). You can get leg cramps from not eating a balanced diet that has enough potassium, calcium, and other minerals. If you do not drink enough fluids or are taking certain medicines, you may develop leg cramps. Other causes of leg pain include injuries, blood flow problems, nerve damage, and twisted and enlarged veins (varicose veins). You can usually ease pain with self-care. Your doctor may recommend that you rest your leg and keep it elevated. Follow-up care is a key part of your treatment and safety. Be sure to make and go to all appointments, and call your doctor if you are having problems. It's also a good idea to know your test results and keep a list of the medicines you take. How can you care for yourself at home? · Take pain medicines exactly as directed. ? If the doctor gave you a prescription medicine for pain, take it as prescribed. ? If you are not taking a prescription pain medicine, ask your doctor if you can take an over-the-counter medicine. · Take any other medicines exactly as prescribed. Call your doctor if you think you are having a problem with your medicine.   · Rest your leg while you have pain, and avoid standing for long periods of time. · Prop up your leg at or above the level of your heart when possible. · Make sure you are eating a balanced diet that is rich in calcium, potassium, and magnesium, especially if you are pregnant. · If directed by your doctor, put ice or a cold pack on the area for 10 to 20 minutes at a time. Put a thin cloth between the ice and your skin. · Your leg may be in a splint, a brace, or an elastic bandage, and you may have crutches to help you walk. Follow your doctor's directions about how long to wear supports and how to use the crutches. When should you call for help? [id number] anytime you think you may need emergency care. For example, call if:  · You have sudden chest pain and shortness of breath, or you cough up blood. · Your leg is cool or pale or changes color. Call your doctor now or seek immediate medical care if:  · You have increasing or severe pain. · Your leg suddenly feels weak and you cannot move it. · You have signs of a blood clot, such as:  ? Pain in your calf, back of the knee, thigh, or groin. ? Redness and swelling in your leg or groin. · You have signs of infection, such as:  ? Increased pain, swelling, warmth, or redness. ? Red streaks leading from the sore area. ? Pus draining from a place on your leg. ? A fever. · You cannot bear weight on your leg. Watch closely for changes in your health, and be sure to contact your doctor if:  · You do not get better as expected. Where can you learn more? Go to http://elissa-devorah.info/  Enter V516 in the search box to learn more about \"Leg Pain: Care Instructions. \"  Current as of: June 26, 2019               Content Version: 12.5  © 6109-2078 [employer]. Care instructions adapted under license by [employer] (which disclaims liability or warranty for this information).  If you have questions about a medical condition or this instruction, always ask your healthcare professional. [address] any warranty or liability for your use of this information.

## 2020-07-20 NOTE — ED PROVIDER NOTES
EMERGENCY DEPARTMENT HISTORY AND PHYSICAL EXAM      Date: 7/20/2020  Patient Name: Segundo Hayward    History of Presenting Illness     Chief Complaint   Patient presents with    Leg Pain     reports R lateral thigh pain x several weeks; increase in sx's over the last few days; hx of DVT; reports that she leads a sedentary lifestyle - denies long travel, smoking, or being on birth control       History Provided By: Patient    HPI: Segundo Hayward, 39 y.o. female with PMHx significant for DVT, GERD, derpession, presents to the ED with cc of right thigh pain. For the last 2 weeks, the patient reports that she wakes up in the morning with an area of paresthesias to her right lateral distal upper leg. The paresthesias gradually improve throughout the day. Yesterday, she began having an aching pain to the same area that has gradually worsened. She has a history of a DVT and is concerned that she may have one again. Pain is currently mild. She denies injury, recent long travel, hormonal medication use, smoking. There are no other complaints, changes, or physical findings at this time. PCP: Thierry Hoskins MD    No current facility-administered medications on file prior to encounter. Current Outpatient Medications on File Prior to Encounter   Medication Sig Dispense Refill    diphenhydrAMINE (BENADRYL) 25 mg capsule Take 50 mg by mouth every six (6) hours as needed.  albuterol (PROVENTIL HFA, VENTOLIN HFA, PROAIR HFA) 90 mcg/actuation inhaler Take 2 Puffs by inhalation every six (6) hours as needed for Wheezing.  1 Inhaler 0       Past History     Past Medical History:  Past Medical History:   Diagnosis Date    Back pain     Cough     Depression     GERD (gastroesophageal reflux disease)     Hemorrhoid     Irregular heart beat     SOB (shortness of breath)     Stress     Thromboembolus (HCC)     Tiredness     Wheezing        Past Surgical History:  No past surgical history on file.    Family History:  Family History   Problem Relation Age of Onset    Mental Retardation Mother     Heart Disease Mother     COPD Mother     Cancer Maternal Grandmother     Diabetes Maternal Grandmother        Social History:  Social History     Tobacco Use    Smoking status: Current Every Day Smoker     Packs/day: 1.50    Smokeless tobacco: Current User   Substance Use Topics    Alcohol use: No    Drug use: No       Allergies: Allergies   Allergen Reactions    Pcn [Penicillins] Anaphylaxis     Had HIVES  Has taken keflex before    Bactrim [Sulfamethoprim] Hives    Doxycycline Hives    Ibuprofen Hives    Naprosyn [Naproxen] Nausea and Vomiting         Review of Systems   Review of Systems   Constitutional: Negative for chills and fever. HENT: Negative for ear pain and sore throat. Eyes: Negative for redness and visual disturbance. Respiratory: Negative for cough and shortness of breath. Cardiovascular: Negative for chest pain and palpitations. Gastrointestinal: Negative for abdominal pain, nausea and vomiting. Genitourinary: Negative for dysuria and hematuria. Musculoskeletal: Negative for back pain and gait problem. +right leg pain   Skin: Negative for rash and wound. Neurological: Positive for numbness. Negative for dizziness and headaches. Psychiatric/Behavioral: Negative for behavioral problems and confusion. All other systems reviewed and are negative. Physical Exam   Physical Exam  Vitals signs and nursing note reviewed. Constitutional:       Appearance: She is well-developed. HENT:      Head: Normocephalic and atraumatic. Eyes:      Conjunctiva/sclera: Conjunctivae normal.      Pupils: Pupils are equal, round, and reactive to light. Neck:      Musculoskeletal: Normal range of motion and neck supple. Cardiovascular:      Rate and Rhythm: Normal rate and regular rhythm. Heart sounds: Normal heart sounds.    Pulmonary:      Effort: Pulmonary effort is normal.      Breath sounds: Normal breath sounds. Musculoskeletal: Normal range of motion. Legs:       Comments: No lower leg edema or tenderness to palpation. Skin:     General: Skin is warm and dry. Findings: No rash. Neurological:      Mental Status: She is alert and oriented to person, place, and time. GCS: GCS eye subscore is 4. GCS verbal subscore is 5. GCS motor subscore is 6. Cranial Nerves: No cranial nerve deficit. Sensory: No sensory deficit. Psychiatric:         Behavior: Behavior normal.           Diagnostic Study Results     Labs -   No results found for this or any previous visit (from the past 12 hour(s)). Radiologic Studies -   No orders to display     CT Results  (Last 48 hours)    None        CXR Results  (Last 48 hours)    None            Medical Decision Making   I am the first provider for this patient. I reviewed the vital signs, available nursing notes, past medical history, past surgical history, family history and social history. Vital Signs-Reviewed the patient's vital signs. Patient Vitals for the past 12 hrs:   Temp Pulse Resp BP SpO2   07/20/20 1528 98 °F (36.7 °C) 81 16 157/80 99 %         Records Reviewed: Nursing Notes and Old Medical Records      Provider Notes (Medical Decision Making):   DDx: neuropathy, radiculopathy, DVT, muscle strain, arthritis    Duplex US is negative for DVT. She has no evidence of cellulitis on exam. Discussed RICE, follow up, and return precautions. ED Course:   Initial assessment performed. The patients presenting problems have been discussed, and they are in agreement with the care plan formulated and outlined with them. I have encouraged them to ask questions as they arise throughout their visit. Disposition:  5:31 PM    The patient has been re-evaluated and is ready for discharge. Reviewed available results with patient. Counseled patient on diagnosis and care plan.  Patient has expressed understanding, and all questions have been answered. Patient agrees with plan and agrees to follow up as recommended, or to return to the ED if their symptoms worsen. Discharge instructions have been provided and explained to the patient, along with reasons to return to the ED. PLAN:  1. Discharge Medication List as of 7/20/2020  5:45 PM        2. Follow-up Information     Follow up With Specialties Details Why Vickie Jerry MD Internal Medicine Schedule an appointment as soon as possible for a visit in 1 week for a recheck [address] [place] [address]  [phone]      [facility] EMERGENCY DEPT Emergency Medicine Go to If symptoms worsen [address] [place] [address]  [phone]        Return to ED if worse     Diagnosis     Clinical Impression:   1. Paresthesia of skin    2. Acute leg pain, right            [place] [facility]  REMIGIO Marroquin

## 2022-03-19 PROBLEM — L03.90 CELLULITIS: Status: ACTIVE | Noted: 2018-11-20

## 2022-03-19 PROBLEM — R00.0 TACHYCARDIA: Status: ACTIVE | Noted: 2018-11-20

## 2022-03-19 PROBLEM — R65.10 SIRS (SYSTEMIC INFLAMMATORY RESPONSE SYNDROME) (HCC): Status: ACTIVE | Noted: 2018-11-20

## 2022-03-20 PROBLEM — D72.829 LEUKOCYTOSIS: Status: ACTIVE | Noted: 2018-11-20

## 2022-08-17 NOTE — PROGRESS NOTES
Bedside and Verbal shift change report given to Markos Correia (oncoming nurse) by CHRISTOS (offgoing nurse). Report included the following information SBAR, Kardex, Intake/Output, MAR and Cardiac Rhythm Sinus tach. Cimetidine Counseling:  I discussed with the patient the risks of Cimetidine including but not limited to gynecomastia, headache, diarrhea, nausea, drowsiness, arrhythmias, pancreatitis, skin rashes, psychosis, bone marrow suppression and kidney toxicity.

## 2023-05-16 RX ORDER — ALBUTEROL SULFATE 90 UG/1
2 AEROSOL, METERED RESPIRATORY (INHALATION) EVERY 6 HOURS PRN
COMMUNITY
Start: 2019-11-03

## 2023-05-16 RX ORDER — DIPHENHYDRAMINE HCL 25 MG
CAPSULE ORAL EVERY 6 HOURS PRN
COMMUNITY